# Patient Record
Sex: FEMALE | Race: WHITE | NOT HISPANIC OR LATINO | Employment: FULL TIME | URBAN - METROPOLITAN AREA
[De-identification: names, ages, dates, MRNs, and addresses within clinical notes are randomized per-mention and may not be internally consistent; named-entity substitution may affect disease eponyms.]

---

## 2018-04-07 ENCOUNTER — OFFICE VISIT (OUTPATIENT)
Dept: URGENT CARE | Facility: CLINIC | Age: 33
End: 2018-04-07
Payer: COMMERCIAL

## 2018-04-07 VITALS
TEMPERATURE: 97 F | SYSTOLIC BLOOD PRESSURE: 136 MMHG | OXYGEN SATURATION: 98 % | DIASTOLIC BLOOD PRESSURE: 92 MMHG | RESPIRATION RATE: 16 BRPM | HEIGHT: 63 IN | HEART RATE: 83 BPM | WEIGHT: 142 LBS | BODY MASS INDEX: 25.16 KG/M2

## 2018-04-07 DIAGNOSIS — H66.93 BILATERAL OTITIS MEDIA, UNSPECIFIED OTITIS MEDIA TYPE: ICD-10-CM

## 2018-04-07 DIAGNOSIS — J02.9 SORE THROAT: ICD-10-CM

## 2018-04-07 DIAGNOSIS — R52 BODY ACHES: Primary | ICD-10-CM

## 2018-04-07 DIAGNOSIS — J01.00 ACUTE NON-RECURRENT MAXILLARY SINUSITIS: ICD-10-CM

## 2018-04-07 LAB
CTP QC/QA: YES
CTP QC/QA: YES
FLUAV AG NPH QL: NEGATIVE
FLUBV AG NPH QL: NEGATIVE
S PYO RRNA THROAT QL PROBE: NEGATIVE

## 2018-04-07 PROCEDURE — 87804 INFLUENZA ASSAY W/OPTIC: CPT | Mod: QW,S$GLB,, | Performed by: NURSE PRACTITIONER

## 2018-04-07 PROCEDURE — 99212 OFFICE O/P EST SF 10 MIN: CPT | Mod: 25,S$GLB,, | Performed by: NURSE PRACTITIONER

## 2018-04-07 PROCEDURE — 87880 STREP A ASSAY W/OPTIC: CPT | Mod: QW,S$GLB,, | Performed by: NURSE PRACTITIONER

## 2018-04-07 PROCEDURE — 96372 THER/PROPH/DIAG INJ SC/IM: CPT | Mod: S$GLB,,, | Performed by: FAMILY MEDICINE

## 2018-04-07 RX ORDER — AMOXICILLIN AND CLAVULANATE POTASSIUM 875; 125 MG/1; MG/1
1 TABLET, FILM COATED ORAL 2 TIMES DAILY
Qty: 20 TABLET | Refills: 0 | Status: SHIPPED | OUTPATIENT
Start: 2018-04-07 | End: 2018-04-17

## 2018-04-07 RX ORDER — BETAMETHASONE SODIUM PHOSPHATE AND BETAMETHASONE ACETATE 3; 3 MG/ML; MG/ML
6 INJECTION, SUSPENSION INTRA-ARTICULAR; INTRALESIONAL; INTRAMUSCULAR; SOFT TISSUE
Status: COMPLETED | OUTPATIENT
Start: 2018-04-07 | End: 2018-04-07

## 2018-04-07 RX ADMIN — BETAMETHASONE SODIUM PHOSPHATE AND BETAMETHASONE ACETATE 6 MG: 3; 3 INJECTION, SUSPENSION INTRA-ARTICULAR; INTRALESIONAL; INTRAMUSCULAR; SOFT TISSUE at 03:04

## 2018-04-07 NOTE — PROGRESS NOTES
"Subjective:       Patient ID: Jessika Hassan is a 32 y.o. female.    Vitals:  height is 5' 3" (1.6 m) and weight is 64.4 kg (142 lb). Her temperature is 97.2 °F (36.2 °C). Her blood pressure is 136/92 (abnormal) and her pulse is 83. Her respiration is 16 and oxygen saturation is 98%.     Chief Complaint: Otalgia and Sore Throat    Pt in for sore throat, been having that for a few days. Then last night pt was having night sweats. Then this morning throat felt worse and has been having hot flashes and chest palpitations. Pt also having ear popping. Denies hx of strep. Been taking ibuprofen for symptoms. Having body aches. Popping in both ears, but mostly the right ear.       Otalgia    There is pain in both ears. The current episode started today. The problem has been rapidly worsening. The pain is at a severity of 8/10. Associated symptoms include headaches, hearing loss and a sore throat. Pertinent negatives include no abdominal pain or coughing.   Sore Throat    Associated symptoms include ear pain and headaches. Pertinent negatives include no abdominal pain, congestion, coughing, hoarse voice or shortness of breath.     Review of Systems   Constitution: Positive for chills, fever and malaise/fatigue.   HENT: Positive for ear pain, hearing loss and sore throat. Negative for congestion and hoarse voice.    Eyes: Negative for discharge and redness.   Cardiovascular: Negative for chest pain, dyspnea on exertion and leg swelling.   Respiratory: Negative for cough, shortness of breath, sputum production and wheezing.    Musculoskeletal: Positive for muscle cramps. Negative for myalgias.   Gastrointestinal: Negative for abdominal pain and nausea.   Neurological: Positive for dizziness, headaches and light-headedness.       Objective:      Physical Exam   Constitutional: She is oriented to person, place, and time. She appears well-developed and well-nourished. She is cooperative.  Non-toxic appearance. She does " not appear ill. No distress.   HENT:   Head: Normocephalic and atraumatic.   Right Ear: Hearing, external ear and ear canal normal. Tympanic membrane is retracted. A middle ear effusion is present.   Left Ear: Hearing, external ear and ear canal normal. Tympanic membrane is retracted. A middle ear effusion is present.   Nose: Mucosal edema and rhinorrhea present. No nasal deformity. No epistaxis. Right sinus exhibits maxillary sinus tenderness. Left sinus exhibits maxillary sinus tenderness. Left sinus exhibits no frontal sinus tenderness.   Mouth/Throat: Uvula is midline and mucous membranes are normal. No trismus in the jaw. Normal dentition. No uvula swelling. Posterior oropharyngeal erythema present.   Eyes: Conjunctivae and lids are normal. Right eye exhibits no discharge. Left eye exhibits no discharge. No scleral icterus.   Sclera clear bilat   Neck: Trachea normal, normal range of motion, full passive range of motion without pain and phonation normal. Neck supple.   Cardiovascular: Normal rate, regular rhythm, normal heart sounds, intact distal pulses and normal pulses.    Pulmonary/Chest: Effort normal and breath sounds normal. No respiratory distress.   Abdominal: Soft. Normal appearance and bowel sounds are normal. She exhibits no distension, no pulsatile midline mass and no mass. There is no tenderness.   Musculoskeletal: Normal range of motion. She exhibits no edema or deformity.   Lymphadenopathy:     She has cervical adenopathy.        Right cervical: Superficial cervical adenopathy present.        Left cervical: Superficial cervical adenopathy present.   Neurological: She is alert and oriented to person, place, and time. She exhibits normal muscle tone. Coordination normal.   Skin: Skin is warm, dry and intact. She is not diaphoretic. No pallor.   Psychiatric: She has a normal mood and affect. Her speech is normal and behavior is normal. Judgment and thought content normal. Cognition and memory are  normal.   Nursing note and vitals reviewed.      Assessment:       1. Body aches    2. Sore throat    3. Bilateral otitis media, unspecified otitis media type    4. Acute non-recurrent maxillary sinusitis        Plan:         Body aches  -     POCT Influenza A/B    Sore throat  -     POCT rapid strep A    Bilateral otitis media, unspecified otitis media type  -     amoxicillin-clavulanate 875-125mg (AUGMENTIN) 875-125 mg per tablet; Take 1 tablet by mouth 2 (two) times daily.  Dispense: 20 tablet; Refill: 0  -     fluticasone (VERAMYST) 27.5 mcg/actuation nasal spray; 2 sprays by Nasal route once daily.  Dispense: 1 g; Refill: 0    Acute non-recurrent maxillary sinusitis  -     betamethasone acetate-betamethasone sodium phosphate injection 6 mg; Inject 1 mL (6 mg total) into the muscle one time.  -     amoxicillin-clavulanate 875-125mg (AUGMENTIN) 875-125 mg per tablet; Take 1 tablet by mouth 2 (two) times daily.  Dispense: 20 tablet; Refill: 0  -     fluticasone (VERAMYST) 27.5 mcg/actuation nasal spray; 2 sprays by Nasal route once daily.  Dispense: 1 g; Refill: 0      Discussed the use of OTC allergy medications during his season until the weather decides to stay hot.     Katie pot may be beneficial with sterile or bottled water as directed.

## 2018-04-07 NOTE — LETTER
April 7, 2018      [facility] [address]  Phone: [phone]  Fax: [phone]       Patient: Jessika Hassan   YOB: 1985  Date of Visit: 04/07/2018    To Whom It May Concern:    Tana Hassan  was at [facility] on 04/07/2018. She may return to work/school on 4/8/2018  with no restrictions. If you have any questions or concerns, or if I can be of further assistance, please do not hesitate to contact me.    Sincerely,    Clara Fung, RT

## 2019-03-04 ENCOUNTER — OFFICE VISIT (OUTPATIENT)
Dept: URGENT CARE | Facility: CLINIC | Age: 34
End: 2019-03-04
Payer: COMMERCIAL

## 2019-03-04 VITALS
HEIGHT: 63 IN | HEART RATE: 83 BPM | DIASTOLIC BLOOD PRESSURE: 80 MMHG | RESPIRATION RATE: 18 BRPM | TEMPERATURE: 97 F | OXYGEN SATURATION: 99 % | BODY MASS INDEX: 23.92 KG/M2 | WEIGHT: 135 LBS | SYSTOLIC BLOOD PRESSURE: 113 MMHG

## 2019-03-04 DIAGNOSIS — F41.9 ANXIETY: Primary | ICD-10-CM

## 2019-03-04 DIAGNOSIS — Z91.89 HISTORY OF FAINTING SPELLS OF UNKNOWN CAUSE: ICD-10-CM

## 2019-03-04 DIAGNOSIS — Z87.898 HISTORY OF PALPITATIONS: ICD-10-CM

## 2019-03-04 LAB
B-HCG UR QL: NEGATIVE
BILIRUB UR QL STRIP: NEGATIVE
CTP QC/QA: YES
GLUCOSE SERPL-MCNC: 146 MG/DL (ref 70–110)
GLUCOSE UR QL STRIP: NEGATIVE
KETONES UR QL STRIP: POSITIVE
LEUKOCYTE ESTERASE UR QL STRIP: NEGATIVE
PH, POC UA: 6 (ref 5–8)
POC ANION GAP: 18 MMOL/L (ref 10–20)
POC BLOOD, URINE: NEGATIVE
POC BUN: 12 MMOL/L (ref 8–26)
POC CHLORIDE: 100 MMOL/L (ref 98–109)
POC CREATININE: 0.6 MG/DL (ref 0.6–1.3)
POC HEMATOCRIT: 41 %PCV (ref 37–47)
POC HEMOGLOBIN: 13.9 G/DL (ref 12.5–16)
POC ICA: 1.1 MMOL/L (ref 1.12–1.32)
POC NITRATES, URINE: NEGATIVE
POC POTASSIUM: 4.3 MMOL/L (ref 3.5–4.9)
POC SODIUM: 138 MMOL/L (ref 138–146)
POC TCO2: 25 MMOL/L (ref 24–29)
PROT UR QL STRIP: NEGATIVE
SP GR UR STRIP: 1 (ref 1–1.03)
UROBILINOGEN UR STRIP-ACNC: NORMAL (ref 0.1–1.1)

## 2019-03-04 PROCEDURE — 81025 URINE PREGNANCY TEST: CPT | Mod: S$GLB,,, | Performed by: NURSE PRACTITIONER

## 2019-03-04 PROCEDURE — 80047 BASIC METABLC PNL IONIZED CA: CPT | Mod: QW,S$GLB,, | Performed by: NURSE PRACTITIONER

## 2019-03-04 PROCEDURE — 81025 POCT URINE PREGNANCY: ICD-10-PCS | Mod: S$GLB,,, | Performed by: NURSE PRACTITIONER

## 2019-03-04 PROCEDURE — 93000 ELECTROCARDIOGRAM COMPLETE: CPT | Mod: S$GLB,,, | Performed by: INTERNAL MEDICINE

## 2019-03-04 PROCEDURE — 3008F BODY MASS INDEX DOCD: CPT | Mod: CPTII,S$GLB,, | Performed by: NURSE PRACTITIONER

## 2019-03-04 PROCEDURE — 3008F PR BODY MASS INDEX (BMI) DOCUMENTED: ICD-10-PCS | Mod: CPTII,S$GLB,, | Performed by: NURSE PRACTITIONER

## 2019-03-04 PROCEDURE — 81003 POCT URINALYSIS, DIPSTICK, AUTOMATED, W/O SCOPE: ICD-10-PCS | Mod: QW,S$GLB,, | Performed by: NURSE PRACTITIONER

## 2019-03-04 PROCEDURE — 99214 OFFICE O/P EST MOD 30 MIN: CPT | Mod: 25,S$GLB,, | Performed by: NURSE PRACTITIONER

## 2019-03-04 PROCEDURE — 99214 PR OFFICE/OUTPT VISIT, EST, LEVL IV, 30-39 MIN: ICD-10-PCS | Mod: 25,S$GLB,, | Performed by: NURSE PRACTITIONER

## 2019-03-04 PROCEDURE — 80047 POCT CHEMISTRY PANEL: ICD-10-PCS | Mod: QW,S$GLB,, | Performed by: NURSE PRACTITIONER

## 2019-03-04 PROCEDURE — 93000 EKG 12-LEAD: ICD-10-PCS | Mod: S$GLB,,, | Performed by: INTERNAL MEDICINE

## 2019-03-04 PROCEDURE — 81003 URINALYSIS AUTO W/O SCOPE: CPT | Mod: QW,S$GLB,, | Performed by: NURSE PRACTITIONER

## 2019-03-04 NOTE — PROGRESS NOTES
"Subjective:       Patient ID: Jessika Hassan is a 33 y.o. female.    Vitals:  height is 5' 3" (1.6 m) and weight is 61.2 kg (135 lb). Her oral temperature is 96.7 °F (35.9 °C). Her blood pressure is 113/80 and her pulse is 83. Her respiration is 18 and oxygen saturation is 99%.     Chief Complaint: Palpitations    Patient presents palpitations & lightheadedness that started 1-2 hours ago.  She states that she got waves of lightheadedness.  She has a history of anxiety, but does not take any prescribed medications for it.       Palpitations    This is a new problem. The current episode started today. The problem occurs intermittently. The problem has been gradually worsening. The symptoms are aggravated by stress. Associated symptoms include anxiety, dizziness, an irregular heartbeat and nausea. Pertinent negatives include no chest fullness, chest pain, coughing, diaphoresis, fever, malaise/fatigue, near-syncope, numbness, shortness of breath, syncope, vomiting or weakness. She has tried nothing for the symptoms. Risk factors include stress. Her past medical history is significant for anxiety. There is no history of anemia, drug use, heart disease, hyperthyroidism or a valve disorder.       Constitution: Negative for chills, sweating and fever.   HENT: Negative for tinnitus, facial swelling, congestion and sinus pain.    Neck: Negative for neck pain and neck stiffness.   Cardiovascular: Positive for palpitations. Negative for chest pain and passing out.   Eyes: Negative for eye pain, photophobia, vision loss, double vision and blurred vision.   Respiratory: Negative for cough and shortness of breath.    Gastrointestinal: Positive for nausea. Negative for vomiting.   Genitourinary: Negative for missed menses.   Musculoskeletal: Negative for trauma and muscle ache.   Skin: Negative for rash, wound and lesion.   Neurological: Positive for dizziness, light-headedness and headaches. Negative for history of " vertigo, facial drooping, speech difficulty, coordination disturbances, loss of balance, history of migraines, disorientation, loss of consciousness and numbness.        Patient reports history of dizziness and feeling like she was going to pass out; however denies present dizziness, disorientation, headache, nausea, memory loss, slurred speech or visual disturbance.     Psychiatric/Behavioral: Positive for nervous/anxious. Negative for disorientation, confusion, sleep disturbance and depression. The patient is nervous/anxious.        Objective:      Physical Exam   Constitutional: She is oriented to person, place, and time. Vital signs are normal. She appears well-developed and well-nourished. She is cooperative.  Non-toxic appearance. She does not have a sickly appearance. She does not appear ill. She appears distressed.   HENT:   Head: Normocephalic and atraumatic.   Right Ear: Hearing, tympanic membrane, external ear and ear canal normal.   Left Ear: Hearing, tympanic membrane, external ear and ear canal normal.   Nose: Nose normal. No mucosal edema, rhinorrhea or nasal deformity. No epistaxis. Right sinus exhibits no maxillary sinus tenderness and no frontal sinus tenderness. Left sinus exhibits no maxillary sinus tenderness and no frontal sinus tenderness.   Mouth/Throat: Uvula is midline, oropharynx is clear and moist and mucous membranes are normal. No trismus in the jaw. Normal dentition. No uvula swelling. No posterior oropharyngeal erythema.   Eyes: Conjunctivae, EOM and lids are normal. Pupils are equal, round, and reactive to light. Right eye exhibits no discharge. Left eye exhibits no discharge. No scleral icterus.   Sclera clear bilat   Neck: Trachea normal, normal range of motion, full passive range of motion without pain and phonation normal. Neck supple.   Cardiovascular: Normal rate, regular rhythm, S1 normal, S2 normal, normal heart sounds, intact distal pulses and normal pulses.    Pulmonary/Chest: Effort normal and breath sounds normal. No respiratory distress. She has no decreased breath sounds. She has no wheezes. She has no rhonchi. She has no rales.   Abdominal: Soft. Normal appearance and bowel sounds are normal. She exhibits no distension, no pulsatile midline mass and no mass. There is no tenderness.   Musculoskeletal: Normal range of motion. She exhibits no edema or deformity.   Neurological: She is alert and oriented to person, place, and time. She has normal strength. She is not disoriented. No cranial nerve deficit or sensory deficit. She exhibits normal muscle tone. She displays a negative Romberg sign. Coordination normal.   Skin: Skin is warm, dry and intact. No rash noted. She is not diaphoretic. No pallor.   Psychiatric: Her speech is normal and behavior is normal. Judgment and thought content normal. Her mood appears anxious. Cognition and memory are normal.   Nursing note and vitals reviewed.      The EKG shows a normal, regular Sinus Rhythm, at a rate of 94, there are not ST Changes. There is not a previous EKG for comparison. This EKG was interpreted by me (and discussed with Dr. Huizar).    Assessment:       1. Anxiety    2. History of palpitations    3. History of fainting spells of unknown cause        Plan:         Anxiety  -     Ambulatory referral to Internal Medicine  -     Cancel: Refer to Emergency Dept.  -     POCT Chemistry Panel    History of palpitations  -     IN OFFICE EKG 12-LEAD (to [place])  -     Ambulatory referral to Internal Medicine  -     Cancel: Refer to Emergency Dept.  -     POCT Chemistry Panel  -     POCT Urinalysis, Dipstick, Automated, W/O Scope  -     POCT urine pregnancy    History of fainting spells of unknown cause    -  Patient advised to go to the ER for any worsening of symptoms or return of symptoms. Patient verbalizes understanding and agrees with plan of care.    Consulted case with Dr. Huizar who agreed with treatment of care.    Patient Instructions       General Referral to [facility]  You were referred to [facility] to Establish Care.  Please call [phone]  to schedule your appointment.    Please return here or go to the Emergency Department for any concerns or worsening of condition.  Please follow up with your primary care doctor or specialist in the next 48-72hrs as needed.    If you  smoke, please stop smoking.    General Discharge Instructions   If you were prescribed a narcotic or controlled medication, do not drive or operate heavy equipment or machinery while taking these medications.  If you were prescribed antibiotics, please take them to completion.  You must understand that you've received an Urgent Care treatment only and that you may be released before all your medical problems are known or treated. You, the patient, will arrange for follow up care as instructed.  Follow up with your PCP or specialty clinic as directed in the next 1-2 days if not improved or as needed.  You can call [phone] to schedule an appointment with the appropriate provider.  If your condition worsens we recommend that you receive another evaluation at the emergency room immediately or contact your primary medical clinics after hours call service to discuss your concerns.  Please go to the Emergency Department for any concerns or worsening of condition.      Your Bodys Response to Anxiety    Normal anxiety is part of the bodys natural defense system. It's an alert to a threat that is unknown, vague, or comes from your own internal fears. While youre in this state, your feelings can range from a vague sense of worry to physical sensations such as a pounding heartbeat. These feelings make you want to react to the threat. An anxiety response is normal in many situations. But when you have an anxiety disorder, the same response can occur at the wrong times.  Anxiety can be helpful  Normal anxiety is a signal from  "your brain that warns you of a threat and is a normal response to help you prevent something or decrease the bad effects of something you can't control. For example, anxiety is a normal response to situations that might damage your body, separate you from a loved one, or lose your job. The symptoms of anxiety can be physical and mental.  How does it feel?  At certain times, people with anxiety may have:  · Dizziness  · Muscle tension or pain  · Restlessness  · Sleeplessness  · Trouble concentrating  · Racing heartbeat  · Fast breathing  · Shaking or trembling  · Stomachache  · Diarrhea  · Loss of energy  · Sweating  · Cold, clammy hands  · Chest pain  · Dry mouth  Anxiety can also be a problem  Anxiety can become a problem when it is hard to control, occurs for months, and interferes with important parts of your life. With an anxiety disorder, your body has the response described above, but in inappropriate ways. The response a person has depends on the anxiety disorder he or she has. With some disorders, the anxiety is way out of proportion to the threat that triggers it. With others, anxiety may occur even when there isnt a clear threat or trigger.  Who does it affect?  Some people are more prone to persistent anxiety than others. It tends to run in families, and it affects more younger people than older people, and more women than men. But no age, race, or gender is immune to anxiety problems.  Anxiety can be treated  The good news is that the anxiety thats disrupting your life can be treated. Check with your healthcare provider and rule out any physical problems that may be causing the anxiety symptoms. If an anxiety disorder is diagnosed seek mental healthcare. This is an illness and it can respond to treatment. Most types of anxiety disorders will respond to "talk therapy" and medicines. Working with your doctor or other healthcare provider, you can develop skills to help you cope with anxiety. You can also " gain the perspective you need to overcome your fears. Note: Good sources of support or guidance can be found at your local hospital, mental health clinic, or an employee assistance program.  How to cope with anxiety  If anxiety is wearing you down, here are some things you can do to cope:  · Keep in mind that you cant control everything about a situation. Change what you can and let the rest take its course.  · Exercise--its a great way to relieve tension and help your body feel relaxed.  · Avoid caffeine and nicotine, which can make anxiety symptoms worse.  · Fight the temptation to turn to alcohol or unprescribed drugs for relief. They only make things worse in the long run.  · Educate yourself about anxiety disorders. Keep track of helpful online resources and books you can use during stressful periods.  · Try stress management techniques such as meditation.  · Consider online or in-person support groups.   Date Last Reviewed: 1/1/2017  © 3333-3742 [employer]. [address] [place] [address], [place] [address]. All rights reserved. This information is not intended as a substitute for professional medical care. Always follow your healthcare professional's instructions.

## 2019-03-04 NOTE — PATIENT INSTRUCTIONS
General Referral to [facility]  You were referred to [facility] to Establish Care.  Please call [phone]  to schedule your appointment.    Please return here or go to the Emergency Department for any concerns or worsening of condition.  Please follow up with your primary care doctor or specialist in the next 48-72hrs as needed.    If you  smoke, please stop smoking.    General Discharge Instructions   If you were prescribed a narcotic or controlled medication, do not drive or operate heavy equipment or machinery while taking these medications.  If you were prescribed antibiotics, please take them to completion.  You must understand that you've received an Urgent Care treatment only and that you may be released before all your medical problems are known or treated. You, the patient, will arrange for follow up care as instructed.  Follow up with your PCP or specialty clinic as directed in the next 1-2 days if not improved or as needed.  You can call [phone] to schedule an appointment with the appropriate provider.  If your condition worsens we recommend that you receive another evaluation at the emergency room immediately or contact your primary medical clinics after hours call service to discuss your concerns.  Please go to the Emergency Department for any concerns or worsening of condition.      Your Bodys Response to Anxiety    Normal anxiety is part of the bodys natural defense system. It's an alert to a threat that is unknown, vague, or comes from your own internal fears. While youre in this state, your feelings can range from a vague sense of worry to physical sensations such as a pounding heartbeat. These feelings make you want to react to the threat. An anxiety response is normal in many situations. But when you have an anxiety disorder, the same response can occur at the wrong times.  Anxiety can be helpful  Normal anxiety is a signal from your brain that warns you of a  "threat and is a normal response to help you prevent something or decrease the bad effects of something you can't control. For example, anxiety is a normal response to situations that might damage your body, separate you from a loved one, or lose your job. The symptoms of anxiety can be physical and mental.  How does it feel?  At certain times, people with anxiety may have:  · Dizziness  · Muscle tension or pain  · Restlessness  · Sleeplessness  · Trouble concentrating  · Racing heartbeat  · Fast breathing  · Shaking or trembling  · Stomachache  · Diarrhea  · Loss of energy  · Sweating  · Cold, clammy hands  · Chest pain  · Dry mouth  Anxiety can also be a problem  Anxiety can become a problem when it is hard to control, occurs for months, and interferes with important parts of your life. With an anxiety disorder, your body has the response described above, but in inappropriate ways. The response a person has depends on the anxiety disorder he or she has. With some disorders, the anxiety is way out of proportion to the threat that triggers it. With others, anxiety may occur even when there isnt a clear threat or trigger.  Who does it affect?  Some people are more prone to persistent anxiety than others. It tends to run in families, and it affects more younger people than older people, and more women than men. But no age, race, or gender is immune to anxiety problems.  Anxiety can be treated  The good news is that the anxiety thats disrupting your life can be treated. Check with your healthcare provider and rule out any physical problems that may be causing the anxiety symptoms. If an anxiety disorder is diagnosed seek mental healthcare. This is an illness and it can respond to treatment. Most types of anxiety disorders will respond to "talk therapy" and medicines. Working with your doctor or other healthcare provider, you can develop skills to help you cope with anxiety. You can also gain the perspective you need " to overcome your fears. Note: Good sources of support or guidance can be found at your local hospital, mental health clinic, or an employee assistance program.  How to cope with anxiety  If anxiety is wearing you down, here are some things you can do to cope:  · Keep in mind that you cant control everything about a situation. Change what you can and let the rest take its course.  · Exercise--its a great way to relieve tension and help your body feel relaxed.  · Avoid caffeine and nicotine, which can make anxiety symptoms worse.  · Fight the temptation to turn to alcohol or unprescribed drugs for relief. They only make things worse in the long run.  · Educate yourself about anxiety disorders. Keep track of helpful online resources and books you can use during stressful periods.  · Try stress management techniques such as meditation.  · Consider online or in-person support groups.   Date Last Reviewed: 1/1/2017  © 0917-3906 [employer]. [address] [place] [address], [place] [address]. All rights reserved. This information is not intended as a substitute for professional medical care. Always follow your healthcare professional's instructions.

## 2019-03-07 ENCOUNTER — TELEPHONE (OUTPATIENT)
Dept: URGENT CARE | Facility: CLINIC | Age: 34
End: 2019-03-07

## 2019-03-11 NOTE — PROGRESS NOTES
Normal sinus rhythm  Normal ECG  When compared with ECG of 04-MAR-2019 13:13,  No significant change was found  Confirmed by Sheldon Eckert MD ([id number]) on 3/11/2019 9:11:01 AM

## 2019-04-17 ENCOUNTER — OFFICE VISIT (OUTPATIENT)
Dept: URGENT CARE | Facility: CLINIC | Age: 34
End: 2019-04-17
Payer: COMMERCIAL

## 2019-04-17 VITALS
TEMPERATURE: 99 F | BODY MASS INDEX: 23.92 KG/M2 | HEART RATE: 91 BPM | SYSTOLIC BLOOD PRESSURE: 121 MMHG | OXYGEN SATURATION: 99 % | WEIGHT: 135 LBS | DIASTOLIC BLOOD PRESSURE: 79 MMHG | HEIGHT: 63 IN | RESPIRATION RATE: 16 BRPM

## 2019-04-17 DIAGNOSIS — T78.40XA ALLERGIC REACTION, INITIAL ENCOUNTER: Primary | ICD-10-CM

## 2019-04-17 PROCEDURE — 99214 OFFICE O/P EST MOD 30 MIN: CPT | Mod: S$GLB,,, | Performed by: NURSE PRACTITIONER

## 2019-04-17 PROCEDURE — 3008F BODY MASS INDEX DOCD: CPT | Mod: CPTII,S$GLB,, | Performed by: NURSE PRACTITIONER

## 2019-04-17 PROCEDURE — 99214 PR OFFICE/OUTPT VISIT, EST, LEVL IV, 30-39 MIN: ICD-10-PCS | Mod: S$GLB,,, | Performed by: NURSE PRACTITIONER

## 2019-04-17 PROCEDURE — 3008F PR BODY MASS INDEX (BMI) DOCUMENTED: ICD-10-PCS | Mod: CPTII,S$GLB,, | Performed by: NURSE PRACTITIONER

## 2019-04-17 RX ORDER — PREDNISONE 20 MG/1
40 TABLET ORAL DAILY
Qty: 6 TABLET | Refills: 0 | Status: SHIPPED | OUTPATIENT
Start: 2019-04-17 | End: 2019-04-20

## 2019-04-17 NOTE — PATIENT INSTRUCTIONS
General Allergic Reactions  An allergic reaction is a set of symptoms caused by an allergen. An allergen is something that causes a persons immune system to react. When a person comes in contact with an allergen, it causes the body to release chemicals. These include the chemical histamine. Histamine causes swelling and itching. It may affect the entire body. This is called a general allergic reaction. Often symptoms affect only 1 part of the body. This is called a local allergic reaction.  You are having an allergic reaction. Almost anything can cause one. Different people are allergic to different things. It is usually something that you ate or swallowed, came into contact with by getting or putting it on your skin or clothes, or something you breathed in the air. This can be very annoying and sometimes scary.  Most of us think of allergic reactions when we have a rash or itchy skin. Symptoms can include:  · Itching of the eyes, nose, and roof of the mouth  · Runny or stuffy nose  · Watery eyes   · Sneezing or coughing   · A blocked feeling in the ear  · Red, itchy rash called hives  · Red and purple spots  · Rash, redness, welts, blisters  · Itching, burning, stinging, pain  · Dry, flaky, cracking, scaly skin  Severe symptoms include:  · Swelling of the face, lips, or other parts of the body  · Hoarse voice  · Trouble swallowing, feeling like your throat is closing  · Trouble breathing, wheezing  · Nausea, vomiting, diarrhea, stomach cramps  · Feeling faint or lightheaded, rapid heart rate  Sometimes the cause may be obvious. But there are so many things that can cause a reaction that you may not be able to figure out. The most important things to help find your allergen are:  · Remembering when it started  · What you were doing at the time or just before that  · Any activities you were involved in  · Any new products or contacts  Below are some common causes. But remember that almost anything can cause a  reaction. You may not even be aware that you came into contact with one of these things:  · Dust, mold, pollen  · Plants (common ones are poison ivy and poison oak, but there are many others)   · Animals  · Foods such as shrimp, shellfish, peanuts, milk products, gluten, and eggs. Also food colorings, flavorings, and additives.  · Insect bites or stings such as bees, mosquitos, fleas, ticks  · Medicines such as penicillin, sulfa medicines, amoxicillin, aspirin, and ibuprofen. But any medicine can cause a reaction.  · Jewelry such as nickel or gold. This can be new, or something youve worn for a while, including zippers and buttons.  · Latex such as in gloves, clothes, toys, balloons, or some tapes. Some people allergic to latex may also have problems with foods like bananas, avocados, kiwi, papaya, or chestnuts.  · Lotions, perfumes, cosmetics, soaps, shampoos, skincare products, nail products  · Chemicals or dyes in clothing, linen, , hair dyes, soaps, iodine  Many viruses and common colds can cause a rash that is not an allergic reaction. Sometimes it is hard to tell the difference between allergies, sensitivity, or an intolerance to something. This is especially true with food. Many things can cause diarrhea, vomiting, stomach cramps, and skin irritation.  Home care    The goal of treatment is to help relieve the symptoms and get you feeling better. The rash will usually fade over several days. But it can sometimes last a couple of weeks. Over the next couple of days, there may be times when it is gets a little worse, and then better again. Here are some things to do:  · If you know what you are allergic to, stay away from it. Future reactions could be worse than this one.  · Avoid tight clothing and anything that heats up your skin (hot showers or baths, direct sunlight). Heat will make itching worse.  · An ice pack will relieve local areas of intense itching and redness. To make an ice pack, put ice  cubes in a plastic bag that seals at the top. Wrap it in a thin, clean towel. Dont put the ice directly on the skin because it can damage the skin.  · Oral diphenhydramine is an over-the-counter antihistamine sold at pharmacy and grocery stores. Unless a prescription antihistamine was given, diphenhydramine may be used to reduce itching if large areas of the skin are involved. It may make you sleepy. So be careful using it in the daytime or when going to school, working, or driving. Note: Dont use diphenhydramine if you have glaucoma or if you are a man with trouble urinating due to an enlarged prostate. There are other antihistamines that wont make you so sleepy. These are good choices for daytime use. Ask your pharmacist for suggestions.  · Dont use diphenhydramine cream on your skin. It can cause a further reaction in some people.  · To help prevent an infection, don't scratch the affected area. Scratching may worsen the reaction and damage your skin. It can also lead to an infection. Always check the affected for signs of an infection.  · Call your healthcare provider and ask what you can use to help decrease the itching.  · To decrease allergic reactions, try the following:    · Use heat-steam to clean your home  · Use high-efficiency particulate (HEPA) vacuums and filters  · Stay away from food and pet triggers  · Kill any cockroaches  · Clean your house often  Follow-up care  Follow up with your healthcare provider, or as advised. If you had a severe reaction today, or if you have had several mild to medium allergic reactions in the past, ask your provider about allergy testing. This can help you find out what you are allergic to. If your reaction included dizziness, fainting, or trouble breathing or swallowing, ask your provider about carrying auto-injectable epinephrine.  Call 911  Call 911 if any of these occur:  · Trouble breathing or swallowing, wheezing  · Cool, moist, pale skin  · Shortness of  breath  · Hoarse voice or trouble speaking  · Confused   · Very drowsy or trouble awakening  · Fainting or loss of consciousness  · Rapid heart rate  · Feeling of dizziness or weakness or a sudden drop in blood pressure  · Feeling of doom  · Feeling lightheaded  · Severe nausea or vomiting, or diarrhea  · Seizure  · Swelling in the face, eyelids, lips, mouth, throat or tongue  · Drooling  When to seek medical advice  Call your healthcare provider right away if any of these occur:  · Spreading areas of itching, redness or swelling  · Nausea or stomach cramps or abdominal pain  · Continuing or recurring symptoms  · Spreading areas of redness, swelling, or itching  · Signs of infection at the affected site:  ¨ Spreading redness  ¨ Increased pain or swelling  ¨ Fluid or colored drainage from the site  ¨ Fever of 100.4°F (38°C) or above lasting for 24 to 48 hours, or as directed by your provider  Date Last Reviewed: 3/1/2017  © 5041-3047 [employer] [address] [place] [address], [place] [address]. All rights reserved. This information is not intended as a substitute for professional medical care. Always follow your healthcare professional's instructions.

## 2019-04-17 NOTE — PROGRESS NOTES
"Subjective:       Patient ID: Jessika Hassan is a 33 y.o. female.    Vitals:  height is 5' 3" (1.6 m) and weight is 61.2 kg (135 lb). Her temperature is 99.3 °F (37.4 °C). Her blood pressure is 121/79 and her pulse is 91. Her respiration is 16 and oxygen saturation is 99%.     Chief Complaint: Allergic Reaction    Pt stated around 2:45pm she ate a seafood gumbo, fried alligator, and shrimp devil egg. Pt stated with in minutes after eating she started having runny nose, cough, itching in throat, and itching on arms, chest, and neck. Pt took a zyrtec and that helped with cough and runny nose. Pt stated she never had a allergic reaction before. Unsure of the cause.     Patient states her lower lip felt "tingly", her upper chest was red with small bumps, then her upper arms, her face felt flushed, etc. Immediately after having eaten several food items while at work. Taken Zyrtec and improving.  No Hx of similar reaction and no known food allergies.  Able to breathe without wheezing or stridor.    Allergic Reaction   This is a new problem. The current episode started today. The problem has been gradually improving since onset. It is unknown what she was exposed to. Associated symptoms include coughing, eye itching, eye watering and itching. Pertinent negatives include no abdominal pain, chest pain, chest pressure, diarrhea, difficulty breathing, drooling, eye redness, globus sensation, hyperventilation, rash, skin blistering, stridor, trouble swallowing, vomiting or wheezing. There is no history of asthma, atopic dermatitis, food allergies, medication allergies or seasonal allergies.       Constitution: Negative for chills, sweating, fatigue and fever.   HENT: Positive for postnasal drip. Negative for ear pain, drooling, congestion, sinus pain, sinus pressure, sore throat, trouble swallowing and voice change.    Neck: Negative for painful lymph nodes.   Cardiovascular: Negative for chest pain.   Eyes: Positive " for eye itching. Negative for eye redness.   Respiratory: Positive for cough. Negative for chest tightness, sputum production, bloody sputum, COPD, shortness of breath, stridor, wheezing and asthma.    Gastrointestinal: Negative for abdominal pain, nausea, vomiting and diarrhea.   Musculoskeletal: Negative for muscle ache.   Skin: Negative for rash.   Allergic/Immunologic: Positive for itching. Negative for seasonal allergies, food allergies and asthma.   Hematologic/Lymphatic: Negative for swollen lymph nodes.       Objective:      Physical Exam   Constitutional: She is oriented to person, place, and time. She appears well-developed and well-nourished. She is cooperative.  Non-toxic appearance. She does not appear ill. No distress.   HENT:   Head: Normocephalic and atraumatic.   Right Ear: Hearing, tympanic membrane, external ear and ear canal normal.   Left Ear: Hearing, tympanic membrane, external ear and ear canal normal.   Nose: Rhinorrhea present. No mucosal edema or nasal deformity. No epistaxis. Right sinus exhibits no maxillary sinus tenderness and no frontal sinus tenderness. Left sinus exhibits no maxillary sinus tenderness and no frontal sinus tenderness.   Mouth/Throat: Uvula is midline and mucous membranes are normal. No trismus in the jaw. Normal dentition. No uvula swelling. Posterior oropharyngeal erythema present. No posterior oropharyngeal edema.   Eyes: Conjunctivae and lids are normal. No scleral icterus.   Sclera clear bilat   Neck: Trachea normal, full passive range of motion without pain and phonation normal. Neck supple.   Cardiovascular: Normal rate, regular rhythm, normal heart sounds, intact distal pulses and normal pulses.   Pulmonary/Chest: Effort normal and breath sounds normal. No stridor. No respiratory distress. She has no decreased breath sounds. She has no wheezes.   Abdominal: Soft. Normal appearance and bowel sounds are normal. She exhibits no distension. There is no tenderness.    Musculoskeletal: Normal range of motion. She exhibits no edema or deformity.   Neurological: She is alert and oriented to person, place, and time. She exhibits normal muscle tone. Coordination normal.   Skin: Skin is warm, dry and intact. She is not diaphoretic. No pallor.   Psychiatric: She has a normal mood and affect. Her speech is normal and behavior is normal. Judgment and thought content normal. Cognition and memory are normal.   Nursing note and vitals reviewed.      Assessment:       1. Allergic reaction, initial encounter        Plan:       Continue taking Zyrtec daily.    Allergic reaction, initial encounter  -     predniSONE (DELTASONE) 20 MG tablet; Take 2 tablets (40 mg total) by mouth once daily. for 3 days  Dispense: 6 tablet; Refill: 0          Patient Instructions     General Allergic Reactions  An allergic reaction is a set of symptoms caused by an allergen. An allergen is something that causes a persons immune system to react. When a person comes in contact with an allergen, it causes the body to release chemicals. These include the chemical histamine. Histamine causes swelling and itching. It may affect the entire body. This is called a general allergic reaction. Often symptoms affect only 1 part of the body. This is called a local allergic reaction.  You are having an allergic reaction. Almost anything can cause one. Different people are allergic to different things. It is usually something that you ate or swallowed, came into contact with by getting or putting it on your skin or clothes, or something you breathed in the air. This can be very annoying and sometimes scary.  Most of us think of allergic reactions when we have a rash or itchy skin. Symptoms can include:  · Itching of the eyes, nose, and roof of the mouth  · Runny or stuffy nose  · Watery eyes   · Sneezing or coughing   · A blocked feeling in the ear  · Red, itchy rash called hives  · Red and purple spots  · Rash, redness, welts,  blisters  · Itching, burning, stinging, pain  · Dry, flaky, cracking, scaly skin  Severe symptoms include:  · Swelling of the face, lips, or other parts of the body  · Hoarse voice  · Trouble swallowing, feeling like your throat is closing  · Trouble breathing, wheezing  · Nausea, vomiting, diarrhea, stomach cramps  · Feeling faint or lightheaded, rapid heart rate  Sometimes the cause may be obvious. But there are so many things that can cause a reaction that you may not be able to figure out. The most important things to help find your allergen are:  · Remembering when it started  · What you were doing at the time or just before that  · Any activities you were involved in  · Any new products or contacts  Below are some common causes. But remember that almost anything can cause a reaction. You may not even be aware that you came into contact with one of these things:  · Dust, mold, pollen  · Plants (common ones are poison ivy and poison oak, but there are many others)   · Animals  · Foods such as shrimp, shellfish, peanuts, milk products, gluten, and eggs. Also food colorings, flavorings, and additives.  · Insect bites or stings such as bees, mosquitos, fleas, ticks  · Medicines such as penicillin, sulfa medicines, amoxicillin, aspirin, and ibuprofen. But any medicine can cause a reaction.  · Jewelry such as nickel or gold. This can be new, or something youve worn for a while, including zippers and buttons.  · Latex such as in gloves, clothes, toys, balloons, or some tapes. Some people allergic to latex may also have problems with foods like bananas, avocados, kiwi, papaya, or chestnuts.  · Lotions, perfumes, cosmetics, soaps, shampoos, skincare products, nail products  · Chemicals or dyes in clothing, linen, , hair dyes, soaps, iodine  Many viruses and common colds can cause a rash that is not an allergic reaction. Sometimes it is hard to tell the difference between allergies, sensitivity, or an  intolerance to something. This is especially true with food. Many things can cause diarrhea, vomiting, stomach cramps, and skin irritation.  Home care    The goal of treatment is to help relieve the symptoms and get you feeling better. The rash will usually fade over several days. But it can sometimes last a couple of weeks. Over the next couple of days, there may be times when it is gets a little worse, and then better again. Here are some things to do:  · If you know what you are allergic to, stay away from it. Future reactions could be worse than this one.  · Avoid tight clothing and anything that heats up your skin (hot showers or baths, direct sunlight). Heat will make itching worse.  · An ice pack will relieve local areas of intense itching and redness. To make an ice pack, put ice cubes in a plastic bag that seals at the top. Wrap it in a thin, clean towel. Dont put the ice directly on the skin because it can damage the skin.  · Oral diphenhydramine is an over-the-counter antihistamine sold at pharmacy and grocery stores. Unless a prescription antihistamine was given, diphenhydramine may be used to reduce itching if large areas of the skin are involved. It may make you sleepy. So be careful using it in the daytime or when going to school, working, or driving. Note: Dont use diphenhydramine if you have glaucoma or if you are a man with trouble urinating due to an enlarged prostate. There are other antihistamines that wont make you so sleepy. These are good choices for daytime use. Ask your pharmacist for suggestions.  · Dont use diphenhydramine cream on your skin. It can cause a further reaction in some people.  · To help prevent an infection, don't scratch the affected area. Scratching may worsen the reaction and damage your skin. It can also lead to an infection. Always check the affected for signs of an infection.  · Call your healthcare provider and ask what you can use to help decrease the  itching.  · To decrease allergic reactions, try the following:    · Use heat-steam to clean your home  · Use high-efficiency particulate (HEPA) vacuums and filters  · Stay away from food and pet triggers  · Kill any cockroaches  · Clean your house often  Follow-up care  Follow up with your healthcare provider, or as advised. If you had a severe reaction today, or if you have had several mild to medium allergic reactions in the past, ask your provider about allergy testing. This can help you find out what you are allergic to. If your reaction included dizziness, fainting, or trouble breathing or swallowing, ask your provider about carrying auto-injectable epinephrine.  Call 911  Call 911 if any of these occur:  · Trouble breathing or swallowing, wheezing  · Cool, moist, pale skin  · Shortness of breath  · Hoarse voice or trouble speaking  · Confused   · Very drowsy or trouble awakening  · Fainting or loss of consciousness  · Rapid heart rate  · Feeling of dizziness or weakness or a sudden drop in blood pressure  · Feeling of doom  · Feeling lightheaded  · Severe nausea or vomiting, or diarrhea  · Seizure  · Swelling in the face, eyelids, lips, mouth, throat or tongue  · Drooling  When to seek medical advice  Call your healthcare provider right away if any of these occur:  · Spreading areas of itching, redness or swelling  · Nausea or stomach cramps or abdominal pain  · Continuing or recurring symptoms  · Spreading areas of redness, swelling, or itching  · Signs of infection at the affected site:  ¨ Spreading redness  ¨ Increased pain or swelling  ¨ Fluid or colored drainage from the site  ¨ Fever of 100.4°F (38°C) or above lasting for 24 to 48 hours, or as directed by your provider  Date Last Reviewed: 3/1/2017  © 6315-5422 [employer]. [address] [place] [address], [place] [address]. All rights reserved. This information is not intended as a substitute for professional medical care. Always follow your  healthcare professional's instructions.

## 2019-04-20 ENCOUNTER — TELEPHONE (OUTPATIENT)
Dept: URGENT CARE | Facility: CLINIC | Age: 34
End: 2019-04-20

## 2023-09-20 ENCOUNTER — CLINICAL SUPPORT (OUTPATIENT)
Dept: OBSTETRICS AND GYNECOLOGY | Facility: CLINIC | Age: 38
End: 2023-09-20
Payer: COMMERCIAL

## 2023-09-20 DIAGNOSIS — N91.2 AMENORRHEA: Primary | ICD-10-CM

## 2023-09-22 ENCOUNTER — OFFICE VISIT (OUTPATIENT)
Dept: OBSTETRICS AND GYNECOLOGY | Facility: CLINIC | Age: 38
End: 2023-09-22
Payer: COMMERCIAL

## 2023-09-22 ENCOUNTER — PATIENT MESSAGE (OUTPATIENT)
Dept: OBSTETRICS AND GYNECOLOGY | Facility: CLINIC | Age: 38
End: 2023-09-22

## 2023-09-22 ENCOUNTER — HOSPITAL ENCOUNTER (OUTPATIENT)
Dept: PERINATAL CARE | Facility: OTHER | Age: 38
Discharge: HOME | End: 2023-09-22
Attending: STUDENT IN AN ORGANIZED HEALTH CARE EDUCATION/TRAINING PROGRAM
Payer: COMMERCIAL

## 2023-09-22 VITALS
WEIGHT: 152.75 LBS | BODY MASS INDEX: 27.07 KG/M2 | SYSTOLIC BLOOD PRESSURE: 122 MMHG | HEIGHT: 63 IN | DIASTOLIC BLOOD PRESSURE: 84 MMHG

## 2023-09-22 DIAGNOSIS — N91.2 AMENORRHEA: ICD-10-CM

## 2023-09-22 DIAGNOSIS — Z3A.01 5 WEEKS GESTATION OF PREGNANCY: ICD-10-CM

## 2023-09-22 DIAGNOSIS — Z12.4 PAP SMEAR FOR CERVICAL CANCER SCREENING: ICD-10-CM

## 2023-09-22 DIAGNOSIS — Z32.01 POSITIVE PREGNANCY TEST: Primary | ICD-10-CM

## 2023-09-22 DIAGNOSIS — N91.4 SECONDARY OLIGOMENORRHEA: ICD-10-CM

## 2023-09-22 DIAGNOSIS — N91.2 AMENORRHEA: Primary | ICD-10-CM

## 2023-09-22 LAB
B-HCG UR QL: POSITIVE
CREAT UR-MCNC: 28 MG/DL (ref 15–325)
CTP QC/QA: YES
PROT UR-MCNC: <7 MG/DL (ref 0–15)
PROT/CREAT UR: NORMAL MG/G{CREAT} (ref 0–0.2)

## 2023-09-22 PROCEDURE — 82570 ASSAY OF URINE CREATININE: CPT | Performed by: FAMILY MEDICINE

## 2023-09-22 PROCEDURE — 87624 HPV HI-RISK TYP POOLED RSLT: CPT | Performed by: FAMILY MEDICINE

## 2023-09-22 PROCEDURE — 99203 OFFICE O/P NEW LOW 30 MIN: CPT | Mod: S$GLB,,, | Performed by: FAMILY MEDICINE

## 2023-09-22 PROCEDURE — 1160F PR REVIEW ALL MEDS BY PRESCRIBER/CLIN PHARMACIST DOCUMENTED: ICD-10-PCS | Mod: CPTII,S$GLB,, | Performed by: FAMILY MEDICINE

## 2023-09-22 PROCEDURE — 3008F PR BODY MASS INDEX (BMI) DOCUMENTED: ICD-10-PCS | Mod: CPTII,S$GLB,, | Performed by: FAMILY MEDICINE

## 2023-09-22 PROCEDURE — 88175 CYTOPATH C/V AUTO FLUID REDO: CPT | Performed by: FAMILY MEDICINE

## 2023-09-22 PROCEDURE — 3079F DIAST BP 80-89 MM HG: CPT | Mod: CPTII,S$GLB,, | Performed by: FAMILY MEDICINE

## 2023-09-22 PROCEDURE — 87591 N.GONORRHOEAE DNA AMP PROB: CPT | Performed by: FAMILY MEDICINE

## 2023-09-22 PROCEDURE — 3074F PR MOST RECENT SYSTOLIC BLOOD PRESSURE < 130 MM HG: ICD-10-PCS | Mod: CPTII,S$GLB,, | Performed by: FAMILY MEDICINE

## 2023-09-22 PROCEDURE — 1159F PR MEDICATION LIST DOCUMENTED IN MEDICAL RECORD: ICD-10-PCS | Mod: CPTII,S$GLB,, | Performed by: FAMILY MEDICINE

## 2023-09-22 PROCEDURE — 3079F PR MOST RECENT DIASTOLIC BLOOD PRESSURE 80-89 MM HG: ICD-10-PCS | Mod: CPTII,S$GLB,, | Performed by: FAMILY MEDICINE

## 2023-09-22 PROCEDURE — 76801 OB US < 14 WKS SINGLE FETUS: CPT | Mod: S$GLB,,, | Performed by: OBSTETRICS & GYNECOLOGY

## 2023-09-22 PROCEDURE — 1160F RVW MEDS BY RX/DR IN RCRD: CPT | Mod: CPTII,S$GLB,, | Performed by: FAMILY MEDICINE

## 2023-09-22 PROCEDURE — 99203 PR OFFICE/OUTPT VISIT, NEW, LEVL III, 30-44 MIN: ICD-10-PCS | Mod: S$GLB,,, | Performed by: FAMILY MEDICINE

## 2023-09-22 PROCEDURE — 1159F MED LIST DOCD IN RCRD: CPT | Mod: CPTII,S$GLB,, | Performed by: FAMILY MEDICINE

## 2023-09-22 PROCEDURE — 76801 OB US < 14 WKS SINGLE FETUS: CPT

## 2023-09-22 PROCEDURE — 3008F BODY MASS INDEX DOCD: CPT | Mod: CPTII,S$GLB,, | Performed by: FAMILY MEDICINE

## 2023-09-22 PROCEDURE — 99999 PR PBB SHADOW E&M-EST. PATIENT-LVL III: CPT | Mod: PBBFAC,,, | Performed by: FAMILY MEDICINE

## 2023-09-22 PROCEDURE — 81025 URINE PREGNANCY TEST: CPT | Mod: S$GLB,,, | Performed by: FAMILY MEDICINE

## 2023-09-22 PROCEDURE — 76801 US OB/GYN PROCEDURE (VIEWPOINT): ICD-10-PCS | Mod: S$GLB,,, | Performed by: OBSTETRICS & GYNECOLOGY

## 2023-09-22 PROCEDURE — 3074F SYST BP LT 130 MM HG: CPT | Mod: CPTII,S$GLB,, | Performed by: FAMILY MEDICINE

## 2023-09-22 PROCEDURE — 99999 PR PBB SHADOW E&M-EST. PATIENT-LVL III: ICD-10-PCS | Mod: PBBFAC,,, | Performed by: FAMILY MEDICINE

## 2023-09-22 PROCEDURE — 81025 POCT URINE PREGNANCY: ICD-10-PCS | Mod: S$GLB,,, | Performed by: FAMILY MEDICINE

## 2023-09-22 PROCEDURE — 87491 CHLMYD TRACH DNA AMP PROBE: CPT | Performed by: FAMILY MEDICINE

## 2023-09-22 PROCEDURE — 87086 URINE CULTURE/COLONY COUNT: CPT | Performed by: FAMILY MEDICINE

## 2023-09-22 NOTE — PROGRESS NOTES
HISTORY OF PRESENT ILLNESS:    Jessika Hassan is a 37 y.o. female, No obstetric history on file.,  No LMP recorded (lmp unknown).  for a routine exam complaining of amenorrhea and positive home UPT. 2 prior elective AB. Pt and partner healthy. Mild pelvic cramping, had spotting once about a week before +upt. Her cycles are irregular. Having breast tenderness, fatigue. No NV. No abn pap or std hx. Works in hotel/tourism. This is the extent of the patient's complaints at this time.     Past Medical History:   Diagnosis Date    Spitz nevus 2016       Past Surgical History:   Procedure Laterality Date    NO PAST SURGERIES         MEDICATIONS AND ALLERGIES:    No current outpatient medications on file.    Review of patient's allergies indicates:  No Known Allergies    Family History   Problem Relation Age of Onset    No Known Problems Mother     No Known Problems Father     Cancer Maternal Grandmother         possible cervical cancer    Diabetes Neg Hx     Stroke Neg Hx        Social History     Socioeconomic History    Marital status: Single   Tobacco Use    Smoking status: Former     Current packs/day: 0.00     Types: Cigarettes     Quit date: 7/15/2015     Years since quittin.1    Smokeless tobacco: Never   Substance and Sexual Activity    Alcohol use: Yes     Alcohol/week: 5.0 standard drinks of alcohol     Types: 5 Glasses of wine per week    Drug use: No    Sexual activity: Yes     Partners: Male   Social History Narrative    She is a  and  of [employer].       COMPREHENSIVE GYN HISTORY:  PAP History: Denies abnormal Paps.  Infection History: Denies STDs. Denies PID.  Benign History: Denies uterine fibroids. + ovarian cysts. Denies endometriosis. Denies other conditions.  Cancer History: Denies cervical cancer. Denies uterine cancer or hyperplasia. Denies ovarian cancer. Denies vulvar cancer or pre-cancer. Denies vaginal cancer or pre-cancer. Denies breast cancer.  "Denies colon cancer.  Sexual Activity History: Reports currently being sexually active  Menstrual History: None.  Contraception: None    ROS:  GENERAL: No weight changes. No swelling. + fatigue. No fever.  CARDIOVASCULAR: No chest pain. No shortness of breath. No leg cramps.   NEUROLOGICAL: No headaches. No vision changes.  BREASTS: + pain. No lumps. No discharge.  ABDOMEN: No pain. No nausea. No vomiting. No diarrhea. No constipation.  REPRODUCTIVE: No abnormal bleeding, spotting once. + mild pelvic cramping  VULVA: No pain. No lesions. No itching.  VAGINA: No relaxation. No itching. No odor. No discharge. No lesions.  URINARY: No incontinence. No nocturia. No frequency. No dysuria.    /84   Ht 5' 3" (1.6 m)   Wt 69.3 kg (152 lb 12.5 oz)   LMP  (LMP Unknown)   BMI 27.06 kg/m²     PE:  Physical Exam:   Constitutional: She appears well-developed and well-nourished. She does not appear ill. No distress.        Pulmonary/Chest: Right breast exhibits no inverted nipple, no mass, no nipple discharge, no skin change, no tenderness and no swelling. Left breast exhibits no inverted nipple, no mass, no nipple discharge, no skin change, no tenderness and no swelling.          Genitourinary:    Urethra, vagina, uterus, right adnexa and left adnexa normal.      Pelvic exam was performed with patient supine.   The external female genitalia was normal.   Genitalia hair distrobution normal .   There is no rash or lesion on the right labia. There is no rash or lesion on the left labia. Cervix is normal. No rectocele, cystocele or unspecified prolapse of vaginal walls in the vagina.    pap smear completedUterus is not tender. Normal urethral meatus.              Neurological: GCS eye subscore is 4. GCS verbal subscore is 5. GCS motor subscore is 6.         PROCEDURES:  UPT Positive  Genprobe  Pap    Results for orders placed or performed in visit on 09/22/23   POCT Urine Pregnancy   Result Value Ref Range    POC Preg Test, " Ur Positive (A) Negative     Acceptable Yes        DIAGNOSIS:  Gyn exam  IUP with stated LMP of No LMP recorded (lmp unknown).    Indication   ========   Indication: Estimation of Gestational Age     History   ======   Risk Factors   History risk factors: AMA     Method   ======   Transabdominal and transvaginal ultrasound examination. 2D Color Doppler, [place] E8. View: Good view     Pregnancy   =========   Pozo pregnancy. Number of fetuses: none     Dating   ======   LMP on: 7/7/2023   GA by LMP 11 w + 0 d   SABINA by LMP: 4/12/2024     Assessment   ==========   Gestational sac: uncertain   GS 3.3 mm 5w 0d Rempen   Location: intrauterine   Yolk sac: not visualized   Amniotic sac: not visualized   Embryo: not visualized     Maternal Structures   ===============   Uterus / Cervix   Uterus: Normal   Cervix: Visualized   Approach: Transvaginal   Ovaries / Tubes / Adnexa   Rt ovary: Normal   Rt ovary D1 47 mm   Rt ovary D2 38 mm   Rt ovary D3 22 mm   Rt ovary Vol 20.6 cmÂ³   Rt ovarian corpus luteum D1 21.0 mm   Rt ovarian corpus luteum D2 16.0 mm   Rt ovarian corpus luteum D3 20.0 mm   Lt ovary: Normal   Lt ovary D1 27 mm   Lt ovary D2 29 mm   Lt ovary D3 23 mm   Lt ovary Vol 9.1 cmÂ³   Cul de Sac / Bladder / Kidneys / Other   Free fluid: Free fluid visualized   Cul de Sac largest pool D1 42.0 mm   Cul de Sac largest pool D2 26.0 mm   Cul de Sac largest pool D3 45.0 mm   Cul de Sac largest pool Vol 25.730 ml     Impression   =========   A probable intrauterine gestational sac is seen. The mean sac diameter (MSD) is 3.3 mm. Neither a yolk sac nor an embryo is seen within the gestational sac. At a MSD of   less than 25 mm, the absence of a yolk sac and embryo may not be pathologic.   The tentative diagnosis, based on ultrasound findings alone, is a pregnancy of uncertain viability.   Other clinical data, as available, should be used in conjunction with ultrasound findings to make a final determination  regarding viability.   If a follow-up study will be ordered to assess viability and to assign SABINA, recommend that this study not be done any sooner than 2 weeks from the date of today's exam.   The maternal adnexae are normal in appearance. The amount of free fluid seen in the pelvis is within normal physiologic range.       PLAN:Routine prenatal care    MEDICATIONS PRESCRIBED:  PNV    LABS AND TESTS ORDERED:  New Ob Labs      1st TRIMESTER COUNSELING: Discussed all, booklet provided  Common complaints of pregnancy  HIV and other routine prenatal tests including  genetic screening  Risk factors identified by prenatal history  Anticipated course of prenatal care  Nutrition and weight gain counseling  Toxoplasmosis precautions (Cats/Raw Meat)  Sexual activity and exercise  Environmental/Work hazards  Travel  Tobacco (Ask, Advise, Assess, Assist, and Arrange), as well as alcohol and drug use  Use of any medications (Including supplements, Vitamins, Herbs, or OTC Drugs)  Indications for Ultrasound  Domestic violence  Seat belt use  Childbirth classes/Hospital facilities     TERATOLOGY COUNSELING: Discussed options for SS, MT21 and carrier screening. Pt will let us know her desires. Discussed time constraints on SS      FOLLOW-UP for a New Ob Visit in  4    weeks with  with repeat dating US at that time (cycles irregular)  -discussed to call clinic or L&D/ER if after hours for pain/bleeding

## 2023-09-22 NOTE — PATIENT INSTRUCTIONS
LABOR AND DELIVERY PHONE NUMBER, [phone] (OPEN , LOCATED ON 6TH FLOOR OF HOSPITAL)  [address] PHONE NUMBER, [phone] (OPEN MON-FRI, 8a-5p)     1) Eat small frequent meals through the day versus three large meals  2) Try ginger ale or sprite to help settle the stomach - you can also take ginger capsules (250mg 4 times per day)  3) Eat crackers or dry toast before getting out of bed in the morning   4) Stay hydrated by drinking plenty of water-do not immediately eat or drink something after vomiting. Give your stomach a rest for 20-30 minutes. Slowly reintroduce ice chips, small sips water, crackers, etc.    5) Try OTC unisom (1/2 tablet) and vitamin B6 - take the 25mg b6 twice a day and then both together at night before bed. This can help with the nausea in the morning and is safe to use during pregnancy.  6) Sea bands (Accupressure wrist bands)    If you are unable to keep anything down and constantly vomiting for more that 24 hours, let the office know so that dehydration can be avoided. We would recommend being seen in the emergency department if this is the case.       Topic  General Pregnancy Information Recommended   (Unless Otherwise Contraindicated Or Restrictions Given To You By Your OB Doctor)      1. Anticipated course of prenatal care      Visits: will be Every 4 wks until 28 weeks, then every 2 weeks until 36 weeks, and then weekly until delivery.   Urine will be collected at each Obstetric visit        2. Nutrition and weight gain    Daily pre-jana vitamin (recommend taking at night)   Additional 300 calories needed daily  No Sushi, hotdogs, unpasteurized products (milk/cheeses). No large fish such as: shark, sarath mackerel, tile, sword fish   Incorporate 12 ounces of smaller seafood/week and no more than 6oz of albacore tuna   Caffeine: 200 mg/day or 2 cups of caffeine/day   Weight gain recommendations are based off of BMI before pregnancy. Generally patients who with normal weight prior  pregnancy it is recommended 25-35 pounds of weight gain during the pregnancy with an estimated weekly gain of 1 pound/wk in 2nd and 3rd Trimester.    3. Toxoplasmosis precautions  If cats are in the home avoid changing litter boxes and if you need to change the litter box recommended you use gloves   4. Sexual Activity  Sexual activity is okay unless you are put on restrictions by your provider. I recommend urinating after intercourse    5. Exercise  Generally pre-pregnancy routine is okay to continue   Drink plenty fluids for hydration   Stop any activity that causes heavy cramping like a period or bright red bleeding and contact your provider  No extreme or contact sports   No exercise on your back for an extended period of time after 20 weeks    6. Hot Tub/Saunas  Avoid hot tubes and saunas    7. Hair Treatments  Because of the lack of scientific studies on the effects of chemical treatments on your hair, we must advise that you do it at your own risk. If you choose to treat your hair, we recommend that you wait until after 12 weeks gestation. At this time there is no reason to believe that normal hair treatment is associated with onsequences to the baby.    8. Vaccines  Influenza vaccine is recommended by CDC during flu season   Tdap (pertussis or whopping cough) recommended each pregnancy between 27 and 36 weeks   Tdap booster recommended for family and other planned direct caregivers    9. Water  Water is an important nutrient in a good diet. However, it cannot be stressed enough that during pregnancy water is essential. The body has increased circulation through blood vessels, and without a large increase in water, pregnant women will be dehydrated. It plays an important role in decreasing constipation, preventing  contractions, decreasing swelling, and preventing dizziness. We recommend that you drink 8-10 glasses of water per day.    10. Smoking/Alcohol/Illicit Drug Use  No safe Level   Can lead to  problems with pregnancy   Growth of the developing fetus    labor (delivery before 37 weeks)    rupture of the membranes (water breaking before 37 weeks)   Premature separation of the placenta (which may cause bleeding)   American College of Obstetricians and Gynecologists endorses abstinence   Can lead to babies with disabilities    11. Environmental or work hazards  Unless otherwise restricted you may continue work throughout the pregnancy   Notify your provider of any work hazards or chemical exposure concerns   12. Travel    Safe to travel up to 35 weeks   Continue to wear a seatbelt and airbags are still recommended   Drink plenty fluids   Blood clots are a concern during pregnancy with long travel. Recommend compression stockings and moving around at least every 2 hours and staying hydrated.    13. Use of medications, vitamins, herbs, OTC drugs    Any medications not on the list provided to you from our clinic or given to you by one of our providers we recommend calling to make sure the medication is safe for you and baby.    14. Domestic Violence    Please notify office immediately of any concerns or violence so that we can help direct you to assistance needed   [place] [employer]: [phone]    15. Childbirth classes    List of Childbirth classes from [facility] is available    16. Selecting a Pediatrician  Selecting a pediatrician before delivery is recommended  You can interview pediatricians before delivery    17. Fetal Monitoring    A simple test of your babys well-being is a kick count. After 26 weeks, fetal motion of any kind should be monitored. Further discussion at that time   18.  Labor Signs    Water break, leaking fluids from Vagina prior 37 weeks  Regular contractions, Contractions that are more than 5-6/hour, getting stronger and painful with lower back pain, does not go away with rest and fluids    19. Postpartum Family Planning    Multiple  options available from short term methods to long term reversible and irreversible methods   Discuss with provider as you get closer to delivery    20. Dental    It is recommended that you get an annual dental cleaning    21. Breastfeeding    Classes offered at [facility] and it is recommended to take a class    22. Lifting In 2013, the National [place] for Occupational Safety and Health (NIOSH) published clinical guidelines for occupational lifting in uncomplicated pregnancies. The recommended weight limits are based on gestational age, intermittent versus repetitive lifting, time (hours/day) spent lifting, and lifting height from floor and distance in 3 front of body. In this guideline, the maximum permissible weight for a woman less than 20 weeks of gestation performing infrequent lifting is 36 pounds (16 kgs) and the maximum permissible weight at ?20 weeks is 26 pounds (12 kgs). For repetitive lifting ?1 hour/day, the maximum weights in the first and second half of pregnancy are 18 pounds (8 kgs) and 13 pounds (6 kgs), respectively, and for repetitive lifting <1 hour/day, the maximum weights are 30 pounds (14 kgs) and 22 pounds (10 kgs), respectively. Although not based on high quality evidence, these guidelines are a reasonable reference for counseling pregnant women     23. Scheduling and Provider Availability    Your Obstetric Doctor is usually here weekly but not every day. We recommend you make 3-4 advanced appointments at a time to accommodate your personal needs and work/school obligations.   We ask that you come 15 minutes prior your scheduled appointment.   For same day appointments (not routine appointments) there is a Nurse Practitioner or another obstetric provider available. Please let the  aware you are an OB patient requesting a same day appointment.      24. Recommended Phone Alexand[facility]      MEDICATIONS IN PREGNANCY     While some medications are considered safe to take  during pregnancy, the effects of other medications on your unborn baby are unknown. Therefore, it is very important to pay special attention to medications you take while you are pregnant.   If you were taking prescription medications before you became pregnant, please ask your health care provider about continuing these medications as soon as you find out that you are pregnant. Do not stop taking any medications without discussing with your health care provider. Your health care provider will weigh the benefits and risks to your pregnancy when making his or her recommendation. With some medications, the risk of not taking them may be more serious than the potential risk of taking them.   If you are prescribed any new medication, please inform your health care provider that you are pregnant. Be sure to discuss the risks and benefits of the newly prescribed medication with your health care provider before taking the medication. We are always available to answer any questions about new medications and how they relate to your pregnancy.     Are Alternative Pregnancy Medicine Therapies Safe?   Many pregnant women believe natural products can be safely used to relieve nausea, backache, and other annoying symptoms of pregnancy, but many of these so-called natural products have not been tested for their safety and effectiveness. Therefore, it is very important to check with your health care provider before taking any alternative therapies. She will not recommend a product or therapy until it is shown to be safe and effective.     Which Over the Counter Drugs Are Safe?   Prenatal vitamins, now available without a prescription, are safe and important to take during pregnancy. Ask your health care provider about the safety of taking other vitamins, herbal remedies and supplements during pregnancy. Most herbal preparations and supplements have not been proven to be safe during pregnancy. Generally, you should not take any  over-the-counter medication unless it is necessary. The following medications and home remedies have no known harmful effects during pregnancy when taken according to the package directions. If you want to know about the safety of any other medications not listed here, please contact your health care provider.      Problem:  Safe to Take:    Pain relief, headache, and fever  Acetaminophen - Tylenol, Anacin Aspirin-Free    Heartburn  Acid neutralizers - Maalox, Mylanta, Rolaids, Tums, Gaviscon   Histamine-blockers - Pepcid, Zantac, Prilosec    Gas pains and bloating  Simethicone - Gas-X, Maalox Anti-Gas, Mylanta Gas, Mylicon    Nausea  Kayleigh - beverages, tablets, candies   Vitamin B6   Emetrol (if not diabetic)   Sea bands   Anti-histamines - Sleep-mraguerite, Benadryl, Bonnine, Dramamine    Cough  Guaifenesin (expectorant) - Hytuss, Mucinex, Robitussin   Dextromethorphan (antitussive) - Benylin, Delsym, Scot-Tussin DM   Guaifenesin plus dextromethorphan - Benylin Expectorant, Robitussin DM    Congestion  Pseudoephedrine - Sudafed, Actifed, Dristan, Neosynephrine   Vicks VapoRub   Saline nasal drops or spray    Sore throat  Throat lozenges - Sucrets, Cepacol, Cepastat, Ricola   Chloroseptic Spray   Warm salt/water gargle    Allergy relief  Chlorpheniramine - Chlor-Trimeton, Triaminic   Loratadine - Alavert, Claritin, Tavist ND, Triaminic Allerchews   Cetirizine - Zyrtec   Diphenhydramine -Benadryl, Diphenhist    Rashes  Hydrocortisone cream or ointment   Caladryl lotion or cream   Benadryl cream   Oatmeal bath (Aveeno)    Diarrhea  Loperamide - Imodium, Kaopectate, Maalox Anti-Diarrheal, Pepto Bismol    Constipation  Fiber supplements - Metamucil, Citrucel, Fiberall/Fibercon, Benefiber   Stool softeners - Colace, Senekot, Dulcolax   Milk of Magnesia    Hemorrhoids  Warm baths   Witch hazel preparations - Tucks medicated pads   Steroid preparations - Anusol-HC, Preparation H    Insomnia  Diphenhydramine - Benadryl, Unisom  SleepGels, Nytol, Sominex   Doxylamine succinate - Unisom Nighttime Sleep-Aid    First-aid ointments  Cortaid, Lanacort, Polysporin, Bacitracin, Neosporin    Yeast infections  Call office for appointment      Connected MOM   Using Wireless Technology to Manage Your Prenatal Health   Congratulations! Your team here at [facility] is excited for the upcoming addition to your family and is ready to support you over the course of your pregnancy. One of the ways we are prepared to help you is through our exciting new Digital Medicine Program, Connected MOM. Connected MOM stands for Connected Maternity Online Monitoring and is offered free of charge as a way to help our expectant mothers manage their pregnancy with fewer visits to the obstetrician.    In the fast-paced environment we live in, patients demand convenient, reliable access to healthcare at their fingertips. Recommended by The American College of Obstetricians and Gynecologists (ACOG), the standard prenatal care model for low-risk pregnancies can average anywhere between 12-14 in-office prenatal visits.    Through this innovative program, participating mothers-to-be receive a wireless scale, wireless blood pressure cuff and an at-home urine protein test kit. Through the use of a smartphone, patients can easily send their weight, blood pressure readings and urine protein test results digitally in real-time from the comfort of their own homes. Results are sent directly to their [facility] account, the systems online patient portal which connects directly to the patients Electronic Medical Record. A specialized Connected MOM care team - comprised of the patients obstetrician, a dedicated health  and a  - reviews the data and provides medical recommendations as needed. This allows expectant mothers to receive care proactively, wherever they are, while minimizing the need for in-person visits and thus minimizing  disruption to their regular daily activities.    How it Works At the initial prenatal visit, interested patients can work with their obstetrician to sign up for the program. After the appointment, expectant mothers can head to the [employer], a first-of-its-kind retail experience created by [facility] offering the latest in cutting-edge, interactive healthcare technology, to receive a Connected MOM kit containing all of the digital tools needed for remote monitoring. Once enrolled, patients weigh themselves regularly and take their blood pressure once a week. Instead of coming to three office appointments at weeks 20, 30, 37 the patient will have the appointment at home. They will take their blood pressure, weight and perform three at-home urine protein tests at these home visits. Results are directly transmitted into the EMR, and notifications and messages from the care team are communicated via [facility].     TECH SUPPORT [phone]  [email or web address]      Chromosomal testing  The sequential screen is a test done around 11-13 weeks that consists of an ultrasound that measures the nuchal fold (neck thickness) of baby and blood work on the same day. You get a second set of labs done a few weeks later after 15 weeks. Based on all three of these results, they are able to tell you if you are considered to be low risk or high risk regarding neural tube defects and chromosomal abnormalities like Down Syndrome. If you are at all interested, I usually place the order today so we do not miss the window. Someone will give you a phone call in a week or two to schedule this. If you do not want it, just tell them no thank you. This test is typically covered by your insurance and is performed by the Maternal Fetal Medicine (MFM) department here at [facility]. It will not tell you the sex of the baby.     OR     2.  Call [phone] to see about coverage and any out of pocket costs regarding the [id number] (MT21) testing.  This is done any day after 11 weeks and is blood work only. It checks for any chromosomal abnormalities like Down Syndrome. You can also find out the sex of the baby if you choose to know. Once you find out coverage and decide to proceed, send either myself or your doctor a message and we can see what date you can do it. It is done at our second floor lab at [facility].

## 2023-09-23 LAB
C TRACH DNA SPEC QL NAA+PROBE: NOT DETECTED
N GONORRHOEA DNA SPEC QL NAA+PROBE: NOT DETECTED

## 2023-09-24 LAB — BACTERIA UR CULT: NO GROWTH

## 2023-09-27 LAB
HPV HR 12 DNA SPEC QL NAA+PROBE: NEGATIVE
HPV16 AG SPEC QL: NEGATIVE
HPV18 DNA SPEC QL NAA+PROBE: NEGATIVE

## 2023-09-29 LAB
FINAL PATHOLOGIC DIAGNOSIS: NORMAL
Lab: NORMAL

## 2023-10-03 ENCOUNTER — PATIENT MESSAGE (OUTPATIENT)
Dept: OBSTETRICS AND GYNECOLOGY | Facility: CLINIC | Age: 38
End: 2023-10-03
Payer: COMMERCIAL

## 2023-10-03 DIAGNOSIS — O21.9 NAUSEA AND VOMITING IN PREGNANCY: Primary | ICD-10-CM

## 2023-10-03 RX ORDER — DOXYLAMINE SUCCINATE AND PYRIDOXINE HYDROCHLORIDE, DELAYED RELEASE TABLETS 10 MG/10 MG 10; 10 MG/1; MG/1
2 TABLET, DELAYED RELEASE ORAL NIGHTLY PRN
Qty: 60 TABLET | Refills: 6 | Status: SHIPPED | OUTPATIENT
Start: 2023-10-03 | End: 2024-03-18

## 2023-10-16 ENCOUNTER — TELEPHONE (OUTPATIENT)
Dept: OBSTETRICS AND GYNECOLOGY | Facility: CLINIC | Age: 38
End: 2023-10-16
Payer: COMMERCIAL

## 2023-10-17 ENCOUNTER — TELEPHONE (OUTPATIENT)
Dept: OBSTETRICS AND GYNECOLOGY | Facility: CLINIC | Age: 38
End: 2023-10-17
Payer: COMMERCIAL

## 2023-10-17 NOTE — TELEPHONE ENCOUNTER
No answer left vm    ----- Message from Katarina Smith MA sent at 10/17/2023  9:34 AM CDT -----  Regarding: FW: returning call  Please advise    ----- Message -----  From: Agatha Simon  Sent: 10/17/2023   9:29 AM CDT  To: Laura Lieberman Staff  Subject: returning call                                     Type:  Patient Returning Call    Who Called:AYESHA UNDERWOOD [[id number]]    Who Left Message for Patient: TAMMY Sanchez     Does the patient know what this is regarding?her [employer] message    Best Call Back Number:[phone]    Additional Information:

## 2023-10-18 DIAGNOSIS — O21.9 NAUSEA AND VOMITING IN PREGNANCY: Primary | ICD-10-CM

## 2023-10-18 RX ORDER — ONDANSETRON 4 MG/1
4 TABLET, ORALLY DISINTEGRATING ORAL DAILY
Qty: 30 TABLET | Refills: 3 | Status: SHIPPED | OUTPATIENT
Start: 2023-10-18 | End: 2023-10-20

## 2023-10-20 ENCOUNTER — INITIAL PRENATAL (OUTPATIENT)
Dept: OBSTETRICS AND GYNECOLOGY | Facility: CLINIC | Age: 38
End: 2023-10-20
Payer: COMMERCIAL

## 2023-10-20 ENCOUNTER — HOSPITAL ENCOUNTER (OUTPATIENT)
Dept: PERINATAL CARE | Facility: OTHER | Age: 38
Discharge: HOME | End: 2023-10-20
Attending: FAMILY MEDICINE
Payer: COMMERCIAL

## 2023-10-20 VITALS
BODY MASS INDEX: 27.57 KG/M2 | WEIGHT: 155.63 LBS | DIASTOLIC BLOOD PRESSURE: 62 MMHG | SYSTOLIC BLOOD PRESSURE: 104 MMHG

## 2023-10-20 DIAGNOSIS — O09.511 AMA (ADVANCED MATERNAL AGE) PRIMIGRAVIDA 35+, FIRST TRIMESTER: Primary | ICD-10-CM

## 2023-10-20 DIAGNOSIS — Z3A.01 5 WEEKS GESTATION OF PREGNANCY: ICD-10-CM

## 2023-10-20 DIAGNOSIS — O21.9 NAUSEA AND VOMITING DURING PREGNANCY: ICD-10-CM

## 2023-10-20 PROCEDURE — 76801 US OB/GYN PROCEDURE (VIEWPOINT): ICD-10-PCS | Mod: 26,,, | Performed by: OBSTETRICS & GYNECOLOGY

## 2023-10-20 PROCEDURE — 0500F PR INITIAL PRENATAL CARE VISIT: ICD-10-PCS | Mod: CPTII,S$GLB,, | Performed by: STUDENT IN AN ORGANIZED HEALTH CARE EDUCATION/TRAINING PROGRAM

## 2023-10-20 PROCEDURE — 76801 OB US < 14 WKS SINGLE FETUS: CPT | Mod: 26,,, | Performed by: OBSTETRICS & GYNECOLOGY

## 2023-10-20 PROCEDURE — 0500F INITIAL PRENATAL CARE VISIT: CPT | Mod: CPTII,S$GLB,, | Performed by: STUDENT IN AN ORGANIZED HEALTH CARE EDUCATION/TRAINING PROGRAM

## 2023-10-20 PROCEDURE — 99999 PR PBB SHADOW E&M-EST. PATIENT-LVL II: CPT | Mod: PBBFAC,,, | Performed by: STUDENT IN AN ORGANIZED HEALTH CARE EDUCATION/TRAINING PROGRAM

## 2023-10-20 PROCEDURE — 76801 OB US < 14 WKS SINGLE FETUS: CPT

## 2023-10-20 PROCEDURE — 99999 PR PBB SHADOW E&M-EST. PATIENT-LVL II: ICD-10-PCS | Mod: PBBFAC,,, | Performed by: STUDENT IN AN ORGANIZED HEALTH CARE EDUCATION/TRAINING PROGRAM

## 2023-10-20 RX ORDER — ONDANSETRON 4 MG/1
8 TABLET, ORALLY DISINTEGRATING ORAL EVERY 6 HOURS PRN
Qty: 30 TABLET | Refills: 2 | Status: SHIPPED | OUTPATIENT
Start: 2023-10-20 | End: 2024-03-18

## 2023-10-20 NOTE — LETTER
October 20, 2023      [facility]  [address] [address]  Phone: [phone]  Fax: [phone]       Patient: Jessika Hassan   YOB: 1985      To Whom It May Concern:    Tana Hassan  is under our care at [facility] for pregnancy.Please excuse our patient for the days of 10/16-10/17. The patient may return to work/school on 10/18/2023 with restrictions. It is recommended that expectant mothers that work in standing positions at repetitive tasks for 3 hours or more should take frequent rest breaks. Expectant mothers that sit for extended periods of time should stand and walk around every two hours. This will improve circulation, reduce eyestrain and relax back muscles. Please limit 15-20 pounds for pushing, pulling and lifting.  If you have any questions or concerns, or if I can be of further assistance, please do not hesitate to contact me.    Sincerely,    Rosalinda Null MD

## 2023-10-20 NOTE — PROGRESS NOTES
Pregnancy dating, labs, ultrasound reports, prenatal testing, and problem list; prior records and results; and available outside records were reviewed and updated in EMR.  Pertinent findings were noted below.    Reason for Visit   Initial Prenatal Visit    HPI   37 y.o., at 8w4d by Estimated Date of Delivery: 5/27/24    Here with partner for initial OB visit. Really struggling with nausea and vomiting. Just started taking the zofran and feeling optimistic about how she feels today.  Mild cramping, no bleeding. Occasional headaches. She is taking unisom/B6 and PNV. Interested in cfDNA. No medical problems.    Exam   /62   Wt 70.6 kg (155 lb 10.3 oz)   LMP  (LMP Unknown)   BMI 27.57 kg/m²     GENERAL: No acute distress  ABD: Gravid    Assessment and Plan   AMA (advanced maternal age) primigravida 35+, first trimester    Nausea and vomiting during pregnancy    Other orders  -     ondansetron (ZOFRAN-ODT) 4 MG TbDL; Take 2 tablets (8 mg total) by mouth every 6 (six) hours as needed (Nausea).  Dispense: 30 tablet; Refill: 2      -- Dating scan today WNL.  -- 1T labs reviewed, WNL.  -- Zofran refilled. Encouraged her to reach out with worsening symptoms.  -- Cost information provided for cfDNA.  -- Discussed starting aspirin at 12-16 weeks.  -- RTC in 4 weeks.      Rosalinda Null MD

## 2023-11-14 ENCOUNTER — PATIENT MESSAGE (OUTPATIENT)
Dept: MATERNAL FETAL MEDICINE | Facility: CLINIC | Age: 38
End: 2023-11-14
Payer: COMMERCIAL

## 2023-11-14 ENCOUNTER — LAB VISIT (OUTPATIENT)
Dept: LAB | Facility: OTHER | Age: 38
End: 2023-11-14
Attending: STUDENT IN AN ORGANIZED HEALTH CARE EDUCATION/TRAINING PROGRAM
Payer: COMMERCIAL

## 2023-11-14 ENCOUNTER — ROUTINE PRENATAL (OUTPATIENT)
Dept: OBSTETRICS AND GYNECOLOGY | Facility: CLINIC | Age: 38
End: 2023-11-14

## 2023-11-14 VITALS
SYSTOLIC BLOOD PRESSURE: 100 MMHG | WEIGHT: 158.94 LBS | BODY MASS INDEX: 28.16 KG/M2 | DIASTOLIC BLOOD PRESSURE: 70 MMHG

## 2023-11-14 DIAGNOSIS — O21.9 NAUSEA/VOMITING IN PREGNANCY: ICD-10-CM

## 2023-11-14 DIAGNOSIS — O09.511 AMA (ADVANCED MATERNAL AGE) PRIMIGRAVIDA 35+, FIRST TRIMESTER: Primary | ICD-10-CM

## 2023-11-14 LAB
ALBUMIN SERPL BCP-MCNC: 3.6 G/DL (ref 3.5–5.2)
ALP SERPL-CCNC: 27 U/L (ref 55–135)
ALT SERPL W/O P-5'-P-CCNC: 38 U/L (ref 10–44)
ANION GAP SERPL CALC-SCNC: 8 MMOL/L (ref 8–16)
AST SERPL-CCNC: 20 U/L (ref 10–40)
BILIRUB SERPL-MCNC: 0.3 MG/DL (ref 0.1–1)
BUN SERPL-MCNC: 9 MG/DL (ref 6–20)
CALCIUM SERPL-MCNC: 9.3 MG/DL (ref 8.7–10.5)
CHLORIDE SERPL-SCNC: 106 MMOL/L (ref 95–110)
CO2 SERPL-SCNC: 24 MMOL/L (ref 23–29)
CREAT SERPL-MCNC: 0.7 MG/DL (ref 0.5–1.4)
EST. GFR  (NO RACE VARIABLE): >60 ML/MIN/1.73 M^2
GLUCOSE SERPL-MCNC: 77 MG/DL (ref 70–110)
MAGNESIUM SERPL-MCNC: 1.9 MG/DL (ref 1.6–2.6)
PHOSPHATE SERPL-MCNC: 3.5 MG/DL (ref 2.7–4.5)
POTASSIUM SERPL-SCNC: 3.6 MMOL/L (ref 3.5–5.1)
PROT SERPL-MCNC: 6.9 G/DL (ref 6–8.4)
SODIUM SERPL-SCNC: 138 MMOL/L (ref 136–145)

## 2023-11-14 PROCEDURE — 84100 ASSAY OF PHOSPHORUS: CPT | Performed by: STUDENT IN AN ORGANIZED HEALTH CARE EDUCATION/TRAINING PROGRAM

## 2023-11-14 PROCEDURE — 83735 ASSAY OF MAGNESIUM: CPT | Performed by: STUDENT IN AN ORGANIZED HEALTH CARE EDUCATION/TRAINING PROGRAM

## 2023-11-14 PROCEDURE — 80053 COMPREHEN METABOLIC PANEL: CPT | Performed by: STUDENT IN AN ORGANIZED HEALTH CARE EDUCATION/TRAINING PROGRAM

## 2023-11-14 PROCEDURE — 0502F PR SUBSEQUENT PRENATAL CARE: ICD-10-PCS | Mod: S$GLB,,, | Performed by: STUDENT IN AN ORGANIZED HEALTH CARE EDUCATION/TRAINING PROGRAM

## 2023-11-14 PROCEDURE — 0502F SUBSEQUENT PRENATAL CARE: CPT | Mod: S$GLB,,, | Performed by: STUDENT IN AN ORGANIZED HEALTH CARE EDUCATION/TRAINING PROGRAM

## 2023-11-14 PROCEDURE — 99999 PR PBB SHADOW E&M-EST. PATIENT-LVL II: CPT | Mod: PBBFAC,,, | Performed by: STUDENT IN AN ORGANIZED HEALTH CARE EDUCATION/TRAINING PROGRAM

## 2023-11-14 PROCEDURE — 99999 PR PBB SHADOW E&M-EST. PATIENT-LVL II: ICD-10-PCS | Mod: PBBFAC,,, | Performed by: STUDENT IN AN ORGANIZED HEALTH CARE EDUCATION/TRAINING PROGRAM

## 2023-11-14 PROCEDURE — 36415 COLL VENOUS BLD VENIPUNCTURE: CPT | Performed by: STUDENT IN AN ORGANIZED HEALTH CARE EDUCATION/TRAINING PROGRAM

## 2023-11-14 RX ORDER — METOCLOPRAMIDE 5 MG/1
5 TABLET ORAL 3 TIMES DAILY
Qty: 90 TABLET | Refills: 1 | Status: SHIPPED | OUTPATIENT
Start: 2023-11-14 | End: 2024-03-18

## 2023-11-14 RX ORDER — ASPIRIN 81 MG/1
81 TABLET ORAL DAILY
Qty: 90 TABLET | Refills: 3 | Status: SHIPPED | OUTPATIENT
Start: 2023-11-14 | End: 2024-03-18

## 2023-11-14 NOTE — PROGRESS NOTES
Pregnancy dating, labs, ultrasound reports, prenatal testing, and problem list; prior records and results; and available outside records were reviewed and updated in EMR.  Pertinent findings were noted below.    Reason for Visit   Routine Prenatal Visit    HPI   38 y.o., at 12w1d by Estimated Date of Delivery: 24    Here for PANCHITO, still feeling really sick. Did not get the relief from zofran that she was hoping for, has resigned herself to feeling miserable. Only keeping down toast, rice, oatmeal. Hesitates to eat because she is already thinking about it coming back up. Trying to stay hydrated, mixing water with gatorade, etc. No cramping or bleeding. Having headaches, which she associates with the zofran. Also having constipation, having to use suppositories.     Exam   /70   Wt 72.1 kg (158 lb 15.2 oz)   LMP  (LMP Unknown)   BMI 28.16 kg/m²     TW pounds    GENERAL: No acute distress  ABD: Gravid    Assessment and Plan   AMA (advanced maternal age) primigravida 35+, first trimester  -     US MFM Procedure (Viewpoint)-Future; Future; Expected date: 2023      -- Has not tried reglan for N/V. Rx sent. Will also check electrolytes today. ED precautions discussed.   -- They do want cfDNA for genetic screening. Drawn today.  -- Dicussed and enrolled in connected mom.  -- Recommend aspirin for preE ppx.  -- Anatomy scan ordered.  -- RTC in 4 weeks.       Rosalinda Null MD

## 2023-11-16 ENCOUNTER — PATIENT MESSAGE (OUTPATIENT)
Dept: MATERNAL FETAL MEDICINE | Facility: CLINIC | Age: 38
End: 2023-11-16
Payer: COMMERCIAL

## 2023-11-16 ENCOUNTER — PATIENT MESSAGE (OUTPATIENT)
Dept: OBSTETRICS AND GYNECOLOGY | Facility: CLINIC | Age: 38
End: 2023-11-16
Payer: COMMERCIAL

## 2023-11-17 ENCOUNTER — PATIENT MESSAGE (OUTPATIENT)
Dept: MATERNAL FETAL MEDICINE | Facility: CLINIC | Age: 38
End: 2023-11-17
Payer: COMMERCIAL

## 2023-11-18 ENCOUNTER — PATIENT MESSAGE (OUTPATIENT)
Dept: ADMINISTRATIVE | Facility: OTHER | Age: 38
End: 2023-11-18
Payer: COMMERCIAL

## 2023-11-21 ENCOUNTER — PATIENT MESSAGE (OUTPATIENT)
Dept: MATERNAL FETAL MEDICINE | Facility: CLINIC | Age: 38
End: 2023-11-21
Payer: COMMERCIAL

## 2023-11-22 ENCOUNTER — TELEPHONE (OUTPATIENT)
Dept: OBSTETRICS AND GYNECOLOGY | Facility: CLINIC | Age: 38
End: 2023-11-22
Payer: COMMERCIAL

## 2023-11-24 ENCOUNTER — PATIENT MESSAGE (OUTPATIENT)
Dept: MATERNAL FETAL MEDICINE | Facility: CLINIC | Age: 38
End: 2023-11-24
Payer: COMMERCIAL

## 2023-11-30 ENCOUNTER — PATIENT MESSAGE (OUTPATIENT)
Dept: MATERNAL FETAL MEDICINE | Facility: CLINIC | Age: 38
End: 2023-11-30
Payer: COMMERCIAL

## 2023-12-11 ENCOUNTER — PATIENT MESSAGE (OUTPATIENT)
Dept: OTHER | Facility: OTHER | Age: 38
End: 2023-12-11
Payer: COMMERCIAL

## 2023-12-18 ENCOUNTER — PATIENT MESSAGE (OUTPATIENT)
Dept: OTHER | Facility: OTHER | Age: 38
End: 2023-12-18
Payer: COMMERCIAL

## 2023-12-19 ENCOUNTER — TELEPHONE (OUTPATIENT)
Dept: OBSTETRICS AND GYNECOLOGY | Facility: HOSPITAL | Age: 38
End: 2023-12-19
Payer: COMMERCIAL

## 2023-12-19 ENCOUNTER — ROUTINE PRENATAL (OUTPATIENT)
Dept: OBSTETRICS AND GYNECOLOGY | Facility: CLINIC | Age: 38
End: 2023-12-19
Payer: COMMERCIAL

## 2023-12-19 VITALS — DIASTOLIC BLOOD PRESSURE: 72 MMHG | BODY MASS INDEX: 29.76 KG/M2 | WEIGHT: 168 LBS | SYSTOLIC BLOOD PRESSURE: 116 MMHG

## 2023-12-19 DIAGNOSIS — O09.512 PRIMIGRAVIDA OF ADVANCED MATERNAL AGE IN SECOND TRIMESTER: Primary | ICD-10-CM

## 2023-12-19 DIAGNOSIS — R82.90 ABNORMAL FINDING IN URINE: ICD-10-CM

## 2023-12-19 PROCEDURE — 99999 PR PBB SHADOW E&M-EST. PATIENT-LVL III: ICD-10-PCS | Mod: PBBFAC,,, | Performed by: STUDENT IN AN ORGANIZED HEALTH CARE EDUCATION/TRAINING PROGRAM

## 2023-12-19 PROCEDURE — 0502F PR SUBSEQUENT PRENATAL CARE: ICD-10-PCS | Mod: CPTII,S$GLB,, | Performed by: STUDENT IN AN ORGANIZED HEALTH CARE EDUCATION/TRAINING PROGRAM

## 2023-12-19 PROCEDURE — 99999 PR PBB SHADOW E&M-EST. PATIENT-LVL III: CPT | Mod: PBBFAC,,, | Performed by: STUDENT IN AN ORGANIZED HEALTH CARE EDUCATION/TRAINING PROGRAM

## 2023-12-19 PROCEDURE — 87086 URINE CULTURE/COLONY COUNT: CPT | Performed by: STUDENT IN AN ORGANIZED HEALTH CARE EDUCATION/TRAINING PROGRAM

## 2023-12-19 PROCEDURE — 0502F SUBSEQUENT PRENATAL CARE: CPT | Mod: CPTII,S$GLB,, | Performed by: STUDENT IN AN ORGANIZED HEALTH CARE EDUCATION/TRAINING PROGRAM

## 2023-12-19 NOTE — PROGRESS NOTES
Pregnancy dating, labs, ultrasound reports, prenatal testing, and problem list; prior records and results; and available outside records were reviewed and updated in EMR.  Pertinent findings were noted below.    Reason for Visit   Routine Prenatal Visit    HPI   38 y.o., at 17w1d by Estimated Date of Delivery: 5/27/24    Here for PANCHITO. Nausea has significantly improved since last visit. Now tolerating most foods. Having some reflux and heartburn, mostly with red gravy. Not taking anything. Also had a week of terrible headaches, which seems to be a little better now. Mild cramping/stretching. No bleeding. Starting to have sciatic type pain bilaterally. Interested in meeting with CNM group.      Exam   /72   Wt 76.2 kg (167 lb 15.9 oz)   LMP  (LMP Unknown)   BMI 29.76 kg/m²     TWG: 15 pounds    GENERAL: No acute distress  ABD: Gravid  BSUS: Active fetus    Assessment and Plan   Primigravida of advanced maternal age in second trimester    Abnormal finding in urine  -     CULTURE, URINE      -- Anatomy scan scheduled for 1/2.  -- Discussed symptomatic management for above issues.  -- Nitrates on urine dip today. Asymptomatic. Will send culture.  -- RTC in 4 weeks.      Rosalinda Null MD

## 2023-12-20 LAB — BACTERIA UR CULT: NO GROWTH

## 2024-01-02 ENCOUNTER — PROCEDURE VISIT (OUTPATIENT)
Dept: MATERNAL FETAL MEDICINE | Facility: CLINIC | Age: 39
End: 2024-01-02
Payer: COMMERCIAL

## 2024-01-02 DIAGNOSIS — Z36.89 ENCOUNTER FOR ULTRASOUND TO CHECK FETAL GROWTH: Primary | ICD-10-CM

## 2024-01-02 DIAGNOSIS — O09.511 AMA (ADVANCED MATERNAL AGE) PRIMIGRAVIDA 35+, FIRST TRIMESTER: ICD-10-CM

## 2024-01-02 PROCEDURE — 76811 OB US DETAILED SNGL FETUS: CPT | Mod: S$GLB,,, | Performed by: OBSTETRICS & GYNECOLOGY

## 2024-01-02 PROCEDURE — 76817 TRANSVAGINAL US OBSTETRIC: CPT | Mod: S$GLB,,, | Performed by: OBSTETRICS & GYNECOLOGY

## 2024-01-04 ENCOUNTER — PATIENT MESSAGE (OUTPATIENT)
Dept: OBSTETRICS AND GYNECOLOGY | Facility: CLINIC | Age: 39
End: 2024-01-04
Payer: COMMERCIAL

## 2024-01-08 ENCOUNTER — PATIENT MESSAGE (OUTPATIENT)
Dept: OTHER | Facility: OTHER | Age: 39
End: 2024-01-08
Payer: COMMERCIAL

## 2024-01-11 ENCOUNTER — TELEPHONE (OUTPATIENT)
Dept: OBSTETRICS AND GYNECOLOGY | Facility: CLINIC | Age: 39
End: 2024-01-11
Payer: COMMERCIAL

## 2024-01-12 ENCOUNTER — ROUTINE PRENATAL (OUTPATIENT)
Dept: OBSTETRICS AND GYNECOLOGY | Facility: CLINIC | Age: 39
End: 2024-01-12
Payer: COMMERCIAL

## 2024-01-12 VITALS
BODY MASS INDEX: 31.24 KG/M2 | DIASTOLIC BLOOD PRESSURE: 74 MMHG | SYSTOLIC BLOOD PRESSURE: 114 MMHG | WEIGHT: 176.38 LBS

## 2024-01-12 DIAGNOSIS — O09.512 PRIMIGRAVIDA OF ADVANCED MATERNAL AGE IN SECOND TRIMESTER: Primary | ICD-10-CM

## 2024-01-12 PROCEDURE — 0502F SUBSEQUENT PRENATAL CARE: CPT | Mod: CPTII,S$GLB,, | Performed by: STUDENT IN AN ORGANIZED HEALTH CARE EDUCATION/TRAINING PROGRAM

## 2024-01-12 PROCEDURE — 99999 PR PBB SHADOW E&M-EST. PATIENT-LVL II: CPT | Mod: PBBFAC,,, | Performed by: STUDENT IN AN ORGANIZED HEALTH CARE EDUCATION/TRAINING PROGRAM

## 2024-01-12 NOTE — PROGRESS NOTES
Pregnancy dating, labs, ultrasound reports, prenatal testing, and problem list; prior records and results; and available outside records were reviewed and updated in EMR.  Pertinent findings were noted below.    Reason for Visit   Routine Prenatal Visit    HPI   38 y.o., at 20w4d by Estimated Date of Delivery: 24    Here for PANCHITO. Feeling a lot better. Had a pretty intense bout of round ligament pain. Also still having some headaches. Constipated but having a bowel movement every other day. Trying to manage with hydration and fiber.  No contractions, vaginal bleeding, leakage of fluid. Reports feeling stronger fetal movement.      Exam   /74   Wt 80 kg (176 lb 5.9 oz)   LMP  (LMP Unknown)   BMI 31.24 kg/m²     TW pounds    GENERAL: No acute distress  ABD: Gravid    Assessment and Plan   Primigravida of advanced maternal age in second trimester  -     OB Glucose Screen; Future; Expected date: 2024  -     CBC W/ AUTO DIFFERENTIAL; Future; Expected date: 2024      -- Anatomy scan reviewed, low lying placenta. Incomplete anatomy. Follow up US on .  -- 2T labs at next visit.  -- RTC in 4 weeks.      Rosalinda Null MD

## 2024-01-30 ENCOUNTER — PROCEDURE VISIT (OUTPATIENT)
Dept: MATERNAL FETAL MEDICINE | Facility: CLINIC | Age: 39
End: 2024-01-30
Payer: COMMERCIAL

## 2024-01-30 DIAGNOSIS — Z36.89 ENCOUNTER FOR ULTRASOUND TO ASSESS FETAL GROWTH: Primary | ICD-10-CM

## 2024-01-30 DIAGNOSIS — O09.511 AMA (ADVANCED MATERNAL AGE) PRIMIGRAVIDA 35+, FIRST TRIMESTER: ICD-10-CM

## 2024-01-30 DIAGNOSIS — Z36.89 ENCOUNTER FOR ULTRASOUND TO CHECK FETAL GROWTH: ICD-10-CM

## 2024-01-30 PROCEDURE — 76816 OB US FOLLOW-UP PER FETUS: CPT | Mod: S$GLB,,, | Performed by: STUDENT IN AN ORGANIZED HEALTH CARE EDUCATION/TRAINING PROGRAM

## 2024-02-05 ENCOUNTER — PATIENT MESSAGE (OUTPATIENT)
Dept: OTHER | Facility: OTHER | Age: 39
End: 2024-02-05
Payer: COMMERCIAL

## 2024-02-05 ENCOUNTER — LAB VISIT (OUTPATIENT)
Dept: LAB | Facility: HOSPITAL | Age: 39
End: 2024-02-05
Attending: STUDENT IN AN ORGANIZED HEALTH CARE EDUCATION/TRAINING PROGRAM
Payer: COMMERCIAL

## 2024-02-05 ENCOUNTER — ROUTINE PRENATAL (OUTPATIENT)
Dept: OBSTETRICS AND GYNECOLOGY | Facility: CLINIC | Age: 39
End: 2024-02-05
Payer: COMMERCIAL

## 2024-02-05 VITALS
BODY MASS INDEX: 32.22 KG/M2 | WEIGHT: 181.88 LBS | DIASTOLIC BLOOD PRESSURE: 68 MMHG | SYSTOLIC BLOOD PRESSURE: 122 MMHG

## 2024-02-05 DIAGNOSIS — O09.512 PRIMIGRAVIDA OF ADVANCED MATERNAL AGE IN SECOND TRIMESTER: ICD-10-CM

## 2024-02-05 DIAGNOSIS — O09.512 PRIMIGRAVIDA OF ADVANCED MATERNAL AGE IN SECOND TRIMESTER: Primary | ICD-10-CM

## 2024-02-05 LAB
BASOPHILS # BLD AUTO: 0.03 K/UL (ref 0–0.2)
BASOPHILS NFR BLD: 0.3 % (ref 0–1.9)
DIFFERENTIAL METHOD BLD: ABNORMAL
EOSINOPHIL # BLD AUTO: 0.1 K/UL (ref 0–0.5)
EOSINOPHIL NFR BLD: 1.1 % (ref 0–8)
ERYTHROCYTE [DISTWIDTH] IN BLOOD BY AUTOMATED COUNT: 13 % (ref 11.5–14.5)
GLUCOSE SERPL-MCNC: 99 MG/DL (ref 70–140)
HCT VFR BLD AUTO: 33.8 % (ref 37–48.5)
HGB BLD-MCNC: 11.3 G/DL (ref 12–16)
IMM GRANULOCYTES # BLD AUTO: 0.31 K/UL (ref 0–0.04)
IMM GRANULOCYTES NFR BLD AUTO: 2.7 % (ref 0–0.5)
LYMPHOCYTES # BLD AUTO: 1.7 K/UL (ref 1–4.8)
LYMPHOCYTES NFR BLD: 15.2 % (ref 18–48)
MCH RBC QN AUTO: 33.2 PG (ref 27–31)
MCHC RBC AUTO-ENTMCNC: 33.4 G/DL (ref 32–36)
MCV RBC AUTO: 99 FL (ref 82–98)
MONOCYTES # BLD AUTO: 0.7 K/UL (ref 0.3–1)
MONOCYTES NFR BLD: 5.8 % (ref 4–15)
NEUTROPHILS # BLD AUTO: 8.5 K/UL (ref 1.8–7.7)
NEUTROPHILS NFR BLD: 74.9 % (ref 38–73)
NRBC BLD-RTO: 0 /100 WBC
PLATELET # BLD AUTO: 260 K/UL (ref 150–450)
PMV BLD AUTO: 10.8 FL (ref 9.2–12.9)
RBC # BLD AUTO: 3.4 M/UL (ref 4–5.4)
WBC # BLD AUTO: 11.35 K/UL (ref 3.9–12.7)

## 2024-02-05 PROCEDURE — 99999 PR PBB SHADOW E&M-EST. PATIENT-LVL III: CPT | Mod: PBBFAC,,, | Performed by: STUDENT IN AN ORGANIZED HEALTH CARE EDUCATION/TRAINING PROGRAM

## 2024-02-05 PROCEDURE — 85025 COMPLETE CBC W/AUTO DIFF WBC: CPT | Performed by: STUDENT IN AN ORGANIZED HEALTH CARE EDUCATION/TRAINING PROGRAM

## 2024-02-05 PROCEDURE — 82950 GLUCOSE TEST: CPT | Performed by: STUDENT IN AN ORGANIZED HEALTH CARE EDUCATION/TRAINING PROGRAM

## 2024-02-05 PROCEDURE — 36415 COLL VENOUS BLD VENIPUNCTURE: CPT | Mod: PN | Performed by: STUDENT IN AN ORGANIZED HEALTH CARE EDUCATION/TRAINING PROGRAM

## 2024-02-05 PROCEDURE — 0502F SUBSEQUENT PRENATAL CARE: CPT | Mod: CPTII,S$GLB,, | Performed by: STUDENT IN AN ORGANIZED HEALTH CARE EDUCATION/TRAINING PROGRAM

## 2024-02-05 NOTE — PROGRESS NOTES
Pregnancy dating, labs, ultrasound reports, prenatal testing, and problem list; prior records and results; and available outside records were reviewed and updated in EMR.  Pertinent findings were noted below.    Reason for Visit   Routine Prenatal Visit    HPI   38 y.o., at 24w0d by Estimated Date of Delivery: 24    Here for PANCHITO, feeling good.  No contractions, vaginal bleeding, leakage of fluid. Reports normal fetal movement.      Exam   /68   Wt 82.5 kg (181 lb 14.1 oz)   LMP  (LMP Unknown)   BMI 32.22 kg/m²     TW pounds    GENERAL: No acute distress  ABD: Gravid    Assessment and Plan   Primigravida of advanced maternal age in second trimester    Other orders  -     (In Office Administered) Tdap Vaccine      -- Reviewed US. Normal anatomy and low lying placenta resolved. Follow up US scheduled for .  -- 2T labs today.  -- Discussed Tdap at next visit, RSV at 32-36 weeks.  -- RTC in 4 weeks.      Rosalinda Null MD

## 2024-02-19 ENCOUNTER — PATIENT MESSAGE (OUTPATIENT)
Dept: OTHER | Facility: OTHER | Age: 39
End: 2024-02-19
Payer: COMMERCIAL

## 2024-03-04 ENCOUNTER — ROUTINE PRENATAL (OUTPATIENT)
Dept: OBSTETRICS AND GYNECOLOGY | Facility: CLINIC | Age: 39
End: 2024-03-04
Payer: COMMERCIAL

## 2024-03-04 ENCOUNTER — PATIENT MESSAGE (OUTPATIENT)
Dept: OTHER | Facility: OTHER | Age: 39
End: 2024-03-04
Payer: COMMERCIAL

## 2024-03-04 VITALS — DIASTOLIC BLOOD PRESSURE: 76 MMHG | SYSTOLIC BLOOD PRESSURE: 122 MMHG | BODY MASS INDEX: 33 KG/M2 | WEIGHT: 186.31 LBS

## 2024-03-04 DIAGNOSIS — O09.513 PRIMIGRAVIDA OF ADVANCED MATERNAL AGE IN THIRD TRIMESTER: Primary | ICD-10-CM

## 2024-03-04 PROCEDURE — 0502F SUBSEQUENT PRENATAL CARE: CPT | Mod: CPTII,S$GLB,, | Performed by: STUDENT IN AN ORGANIZED HEALTH CARE EDUCATION/TRAINING PROGRAM

## 2024-03-04 PROCEDURE — 99999 PR PBB SHADOW E&M-EST. PATIENT-LVL II: CPT | Mod: PBBFAC,,, | Performed by: STUDENT IN AN ORGANIZED HEALTH CARE EDUCATION/TRAINING PROGRAM

## 2024-03-04 NOTE — PROGRESS NOTES
Pregnancy dating, labs, ultrasound reports, prenatal testing, and problem list; prior records and results; and available outside records were reviewed and updated in EMR.  Pertinent findings were noted below.    Reason for Visit   Routine Prenatal Visit    HPI   38 y.o., at 28w0d by Estimated Date of Delivery: 24    Here for PANCHITO. Occasional mild cramps, nothing concerning. No regular contractions, vaginal bleeding, leakage of fluid. Reports normal fetal movement. Considering vasectomy for contraception. Plans to breastfeed.      Exam   /76   Wt 84.5 kg (186 lb 4.6 oz)   LMP  (LMP Unknown)   BMI 33.00 kg/m²     TW pounds    GENERAL: No acute distress  ABD: Gravid    Assessment and Plan   Primigravida of advanced maternal age in third trimester    Care and examination of lactating mother  -     BREAST PUMP FOR HOME USE      -- Breast pump Rx given.  -- Growth US scheduled for  due to AMA.  -- Will schedule Tdap.  -- RTC in 2-4 weeks.      Rosalinda Null MD

## 2024-03-11 ENCOUNTER — PATIENT MESSAGE (OUTPATIENT)
Dept: OBSTETRICS AND GYNECOLOGY | Facility: CLINIC | Age: 39
End: 2024-03-11
Payer: COMMERCIAL

## 2024-03-18 ENCOUNTER — ROUTINE PRENATAL (OUTPATIENT)
Dept: OBSTETRICS AND GYNECOLOGY | Facility: CLINIC | Age: 39
End: 2024-03-18
Payer: COMMERCIAL

## 2024-03-18 ENCOUNTER — PATIENT MESSAGE (OUTPATIENT)
Dept: OTHER | Facility: OTHER | Age: 39
End: 2024-03-18
Payer: COMMERCIAL

## 2024-03-18 VITALS — DIASTOLIC BLOOD PRESSURE: 70 MMHG | SYSTOLIC BLOOD PRESSURE: 118 MMHG | BODY MASS INDEX: 33.04 KG/M2 | WEIGHT: 186.5 LBS

## 2024-03-18 DIAGNOSIS — R82.90 ABNORMAL URINE FINDING: ICD-10-CM

## 2024-03-18 DIAGNOSIS — O09.513 PRIMIGRAVIDA OF ADVANCED MATERNAL AGE IN THIRD TRIMESTER: Primary | ICD-10-CM

## 2024-03-18 PROCEDURE — 99999 PR PBB SHADOW E&M-EST. PATIENT-LVL II: CPT | Mod: PBBFAC,,, | Performed by: STUDENT IN AN ORGANIZED HEALTH CARE EDUCATION/TRAINING PROGRAM

## 2024-03-18 PROCEDURE — 87086 URINE CULTURE/COLONY COUNT: CPT | Performed by: STUDENT IN AN ORGANIZED HEALTH CARE EDUCATION/TRAINING PROGRAM

## 2024-03-18 PROCEDURE — 0502F SUBSEQUENT PRENATAL CARE: CPT | Mod: CPTII,S$GLB,, | Performed by: STUDENT IN AN ORGANIZED HEALTH CARE EDUCATION/TRAINING PROGRAM

## 2024-03-20 LAB
BACTERIA UR CULT: NORMAL
BACTERIA UR CULT: NORMAL

## 2024-04-01 ENCOUNTER — CLINICAL SUPPORT (OUTPATIENT)
Dept: OBSTETRICS AND GYNECOLOGY | Facility: CLINIC | Age: 39
End: 2024-04-01
Payer: COMMERCIAL

## 2024-04-01 ENCOUNTER — TELEPHONE (OUTPATIENT)
Dept: OBSTETRICS AND GYNECOLOGY | Facility: CLINIC | Age: 39
End: 2024-04-01
Payer: COMMERCIAL

## 2024-04-01 ENCOUNTER — ROUTINE PRENATAL (OUTPATIENT)
Dept: OBSTETRICS AND GYNECOLOGY | Facility: CLINIC | Age: 39
End: 2024-04-01
Payer: COMMERCIAL

## 2024-04-01 ENCOUNTER — PROCEDURE VISIT (OUTPATIENT)
Dept: MATERNAL FETAL MEDICINE | Facility: CLINIC | Age: 39
End: 2024-04-01
Payer: COMMERCIAL

## 2024-04-01 VITALS
SYSTOLIC BLOOD PRESSURE: 104 MMHG | BODY MASS INDEX: 34.17 KG/M2 | WEIGHT: 192.88 LBS | DIASTOLIC BLOOD PRESSURE: 74 MMHG

## 2024-04-01 DIAGNOSIS — O09.511 AMA (ADVANCED MATERNAL AGE) PRIMIGRAVIDA 35+, FIRST TRIMESTER: ICD-10-CM

## 2024-04-01 DIAGNOSIS — Z34.83 ENCOUNTER FOR SUPERVISION OF OTHER NORMAL PREGNANCY IN THIRD TRIMESTER: ICD-10-CM

## 2024-04-01 DIAGNOSIS — Z3A.32 32 WEEKS GESTATION OF PREGNANCY: Primary | ICD-10-CM

## 2024-04-01 DIAGNOSIS — Z23 NEED FOR TDAP VACCINATION: Primary | ICD-10-CM

## 2024-04-01 DIAGNOSIS — R82.90 ABNORMAL URINALYSIS: ICD-10-CM

## 2024-04-01 DIAGNOSIS — Z36.89 ENCOUNTER FOR ULTRASOUND TO ASSESS FETAL GROWTH: ICD-10-CM

## 2024-04-01 PROCEDURE — 76817 TRANSVAGINAL US OBSTETRIC: CPT | Mod: S$GLB,,, | Performed by: OBSTETRICS & GYNECOLOGY

## 2024-04-01 PROCEDURE — 99999 PR PBB SHADOW E&M-EST. PATIENT-LVL III: CPT | Mod: PBBFAC,,, | Performed by: FAMILY MEDICINE

## 2024-04-01 PROCEDURE — 90715 TDAP VACCINE 7 YRS/> IM: CPT | Mod: S$GLB,,, | Performed by: STUDENT IN AN ORGANIZED HEALTH CARE EDUCATION/TRAINING PROGRAM

## 2024-04-01 PROCEDURE — 0502F SUBSEQUENT PRENATAL CARE: CPT | Mod: CPTII,S$GLB,, | Performed by: FAMILY MEDICINE

## 2024-04-01 PROCEDURE — 76816 OB US FOLLOW-UP PER FETUS: CPT | Mod: S$GLB,,, | Performed by: OBSTETRICS & GYNECOLOGY

## 2024-04-01 PROCEDURE — 87086 URINE CULTURE/COLONY COUNT: CPT | Performed by: FAMILY MEDICINE

## 2024-04-01 PROCEDURE — 99999 PR PBB SHADOW E&M-EST. PATIENT-LVL I: CPT | Mod: PBBFAC,,,

## 2024-04-01 PROCEDURE — 90471 IMMUNIZATION ADMIN: CPT | Mod: S$GLB,,, | Performed by: STUDENT IN AN ORGANIZED HEALTH CARE EDUCATION/TRAINING PROGRAM

## 2024-04-01 NOTE — PROGRESS NOTES
Here for routine OB appt at 32w0d, with no complaints. Growth US today, AC3+ weeks, FH normal. Pt unsure if she'd like to do repeat growth scan in 4 weeks due to cost, discussed can consider if abnormal FH.  Reports good FM.  Denies LOF, denies VB, denies contractions. No HAs. Tdap done. A POS. +nitrates on UA, no dysuria, hematuria, fever. Will send for urine cx. Pt mentions wanting to switch to ABC, will message.   Reviewed warning signs of Labor and Preeclampsia.  Daily FM counts reinforced.  F/U scheduled 2 weeks

## 2024-04-01 NOTE — TELEPHONE ENCOUNTER
I called pt to schedule initial transfer appt. I schedule pt for Wednesday April 24, 2024 @ 9;00am. Pt agreed with date and time.

## 2024-04-01 NOTE — PATIENT INSTRUCTIONS
[facility] DELIVERY PHONE NUMBER, [phone] (OPEN 24/7, LOCATED ON 6TH FLOOR OF HOSPITAL)  [address] PHONE NUMBER, [phone] (OPEN MON-FRI, 8a-5p)

## 2024-04-02 DIAGNOSIS — O36.63X0 EXCESSIVE FETAL GROWTH AFFECTING MANAGEMENT OF PREGNANCY IN THIRD TRIMESTER, SINGLE OR UNSPECIFIED FETUS: Primary | ICD-10-CM

## 2024-04-03 LAB — BACTERIA UR CULT: NO GROWTH

## 2024-04-08 ENCOUNTER — PATIENT MESSAGE (OUTPATIENT)
Dept: MATERNAL FETAL MEDICINE | Facility: CLINIC | Age: 39
End: 2024-04-08
Payer: COMMERCIAL

## 2024-04-10 ENCOUNTER — PATIENT MESSAGE (OUTPATIENT)
Dept: OBSTETRICS AND GYNECOLOGY | Facility: CLINIC | Age: 39
End: 2024-04-10
Payer: COMMERCIAL

## 2024-04-11 RX ORDER — HYDROCORTISONE ACETATE 25 MG/1
25 SUPPOSITORY RECTAL 2 TIMES DAILY
Qty: 20 SUPPOSITORY | Refills: 0 | Status: SHIPPED | OUTPATIENT
Start: 2024-04-11 | End: 2024-04-21

## 2024-04-11 RX ORDER — HYDROCORTISONE 25 MG/G
CREAM TOPICAL 2 TIMES DAILY
Qty: 20 G | Refills: 0 | Status: ON HOLD | OUTPATIENT
Start: 2024-04-11 | End: 2024-05-31 | Stop reason: HOSPADM

## 2024-04-17 ENCOUNTER — LAB VISIT (OUTPATIENT)
Dept: LAB | Facility: OTHER | Age: 39
End: 2024-04-17
Attending: STUDENT IN AN ORGANIZED HEALTH CARE EDUCATION/TRAINING PROGRAM
Payer: COMMERCIAL

## 2024-04-17 ENCOUNTER — ROUTINE PRENATAL (OUTPATIENT)
Dept: OBSTETRICS AND GYNECOLOGY | Facility: CLINIC | Age: 39
End: 2024-04-17
Payer: COMMERCIAL

## 2024-04-17 VITALS
BODY MASS INDEX: 34.09 KG/M2 | SYSTOLIC BLOOD PRESSURE: 140 MMHG | DIASTOLIC BLOOD PRESSURE: 78 MMHG | WEIGHT: 192.44 LBS

## 2024-04-17 DIAGNOSIS — O36.63X0 EXCESSIVE FETAL GROWTH AFFECTING MANAGEMENT OF PREGNANCY IN THIRD TRIMESTER, SINGLE OR UNSPECIFIED FETUS: ICD-10-CM

## 2024-04-17 DIAGNOSIS — O09.513 PRIMIGRAVIDA OF ADVANCED MATERNAL AGE IN THIRD TRIMESTER: Primary | ICD-10-CM

## 2024-04-17 DIAGNOSIS — O16.3 ELEVATED BLOOD PRESSURE AFFECTING PREGNANCY IN THIRD TRIMESTER, ANTEPARTUM: ICD-10-CM

## 2024-04-17 DIAGNOSIS — O09.513 PRIMIGRAVIDA OF ADVANCED MATERNAL AGE IN THIRD TRIMESTER: ICD-10-CM

## 2024-04-17 LAB
BASOPHILS # BLD AUTO: 0.02 K/UL (ref 0–0.2)
BASOPHILS NFR BLD: 0.2 % (ref 0–1.9)
DIFFERENTIAL METHOD BLD: ABNORMAL
EOSINOPHIL # BLD AUTO: 0.1 K/UL (ref 0–0.5)
EOSINOPHIL NFR BLD: 0.4 % (ref 0–8)
ERYTHROCYTE [DISTWIDTH] IN BLOOD BY AUTOMATED COUNT: 14.5 % (ref 11.5–14.5)
HCT VFR BLD AUTO: 38.2 % (ref 37–48.5)
HGB BLD-MCNC: 12.7 G/DL (ref 12–16)
HIV 1+2 AB+HIV1 P24 AG SERPL QL IA: NEGATIVE
IMM GRANULOCYTES # BLD AUTO: 0.35 K/UL (ref 0–0.04)
IMM GRANULOCYTES NFR BLD AUTO: 2.9 % (ref 0–0.5)
LYMPHOCYTES # BLD AUTO: 2.2 K/UL (ref 1–4.8)
LYMPHOCYTES NFR BLD: 18.2 % (ref 18–48)
MCH RBC QN AUTO: 32.2 PG (ref 27–31)
MCHC RBC AUTO-ENTMCNC: 33.2 G/DL (ref 32–36)
MCV RBC AUTO: 97 FL (ref 82–98)
MONOCYTES # BLD AUTO: 0.9 K/UL (ref 0.3–1)
MONOCYTES NFR BLD: 7.1 % (ref 4–15)
NEUTROPHILS # BLD AUTO: 8.5 K/UL (ref 1.8–7.7)
NEUTROPHILS NFR BLD: 71.2 % (ref 38–73)
NRBC BLD-RTO: 0 /100 WBC
PLATELET # BLD AUTO: 225 K/UL (ref 150–450)
PMV BLD AUTO: 10.5 FL (ref 9.2–12.9)
RBC # BLD AUTO: 3.94 M/UL (ref 4–5.4)
TREPONEMA PALLIDUM IGG+IGM AB [PRESENCE] IN SERUM OR PLASMA BY IMMUNOASSAY: NONREACTIVE
WBC # BLD AUTO: 11.92 K/UL (ref 3.9–12.7)

## 2024-04-17 PROCEDURE — 85025 COMPLETE CBC W/AUTO DIFF WBC: CPT | Performed by: STUDENT IN AN ORGANIZED HEALTH CARE EDUCATION/TRAINING PROGRAM

## 2024-04-17 PROCEDURE — 0502F SUBSEQUENT PRENATAL CARE: CPT | Mod: CPTII,S$GLB,, | Performed by: STUDENT IN AN ORGANIZED HEALTH CARE EDUCATION/TRAINING PROGRAM

## 2024-04-17 PROCEDURE — 36415 COLL VENOUS BLD VENIPUNCTURE: CPT | Performed by: STUDENT IN AN ORGANIZED HEALTH CARE EDUCATION/TRAINING PROGRAM

## 2024-04-17 PROCEDURE — 99999 PR PBB SHADOW E&M-EST. PATIENT-LVL II: CPT | Mod: PBBFAC,,, | Performed by: STUDENT IN AN ORGANIZED HEALTH CARE EDUCATION/TRAINING PROGRAM

## 2024-04-17 PROCEDURE — 87389 HIV-1 AG W/HIV-1&-2 AB AG IA: CPT | Performed by: STUDENT IN AN ORGANIZED HEALTH CARE EDUCATION/TRAINING PROGRAM

## 2024-04-17 PROCEDURE — 86593 SYPHILIS TEST NON-TREP QUANT: CPT | Performed by: STUDENT IN AN ORGANIZED HEALTH CARE EDUCATION/TRAINING PROGRAM

## 2024-04-17 RX ORDER — ASPIRIN 81 MG/1
81 TABLET ORAL DAILY
Status: ON HOLD | COMMUNITY
Start: 2024-04-17 | End: 2024-05-31 | Stop reason: HOSPADM

## 2024-04-17 NOTE — PROGRESS NOTES
Pregnancy dating, labs, ultrasound reports, prenatal testing, and problem list; prior records and results; and available outside records were reviewed and updated in EMR.  Pertinent findings were noted below.    Reason for Visit   Routine Prenatal Visit    HPI   38 y.o., at 34w2d by Estimated Date of Delivery: 24    Here for PANCHITO. Starting have trouble working due to fatigue, getting short of breath easily. She is still trying to stay active, going for 3 20-minute walks per day. +BHC.  No regular contractions, vaginal bleeding, leakage of fluid. Reports normal fetal movement. Has a pretty intense episode of dizziness that was isolated. More frequent headaches but still mild. Resolve with hydration. No vision changes, other preE symptoms.      Exam   BP (!) 140/78   Wt 87.3 kg (192 lb 7.4 oz)   LMP  (LMP Unknown)   BMI 34.09 kg/m²     TW pounds    GENERAL: No acute distress  ABD: Gravid    Assessment and Plan   Primigravida of advanced maternal age in third trimester  -     Treponema Pallidium Antibodies IgG, IgM; Future; Expected date: 2024  -     CBC Auto Differential; Future; Expected date: 2024  -     HIV 1/2 Ag/Ab (4th Gen); Future; Expected date: 2024    Elevated blood pressure affecting pregnancy in third trimester, antepartum  -     COMPREHENSIVE METABOLIC PANEL; Future; Expected date: 2024    Excessive fetal growth affecting management of pregnancy in third trimester, single or unspecified fetus    Other orders  -     aspirin (ECOTRIN) 81 MG EC tablet; Take 1 tablet (81 mg total) by mouth once daily.      -- BP mild range. Repeat WNL. Will add CMP to 3T labs.  -- Discussed 32 week US. EFW WNL but AC measuring 3 weeks ahead. Discussed rationale for repeat scan, they will consider. Concerned about cost. Fundal height normal today.  -- Interested in transitioning care to ABC. Appointment scheduled for next week.      Rosalinda Null MD

## 2024-04-22 ENCOUNTER — PATIENT MESSAGE (OUTPATIENT)
Dept: OTHER | Facility: OTHER | Age: 39
End: 2024-04-22
Payer: COMMERCIAL

## 2024-04-24 ENCOUNTER — PATIENT MESSAGE (OUTPATIENT)
Dept: OBSTETRICS AND GYNECOLOGY | Facility: CLINIC | Age: 39
End: 2024-04-24

## 2024-04-24 ENCOUNTER — INITIAL PRENATAL (OUTPATIENT)
Dept: OBSTETRICS AND GYNECOLOGY | Facility: CLINIC | Age: 39
End: 2024-04-24
Payer: COMMERCIAL

## 2024-04-24 ENCOUNTER — PATIENT MESSAGE (OUTPATIENT)
Dept: MATERNAL FETAL MEDICINE | Facility: CLINIC | Age: 39
End: 2024-04-24
Payer: COMMERCIAL

## 2024-04-24 VITALS
WEIGHT: 194.25 LBS | BODY MASS INDEX: 34.41 KG/M2 | DIASTOLIC BLOOD PRESSURE: 81 MMHG | SYSTOLIC BLOOD PRESSURE: 131 MMHG

## 2024-04-24 DIAGNOSIS — Z34.90 FUNDAL HEIGHT HIGH FOR DATES: ICD-10-CM

## 2024-04-24 DIAGNOSIS — O44.42 LOW-LYING PLACENTA IN SECOND TRIMESTER: ICD-10-CM

## 2024-04-24 DIAGNOSIS — O26.00 EXCESSIVE WEIGHT GAIN AFFECTING PREGNANCY: ICD-10-CM

## 2024-04-24 DIAGNOSIS — Z3A.35 35 WEEKS GESTATION OF PREGNANCY: Primary | ICD-10-CM

## 2024-04-24 PROBLEM — N91.2 AMENORRHEA: Status: RESOLVED | Noted: 2023-09-22 | Resolved: 2024-04-24

## 2024-04-24 PROBLEM — Z3A.01 5 WEEKS GESTATION OF PREGNANCY: Status: RESOLVED | Noted: 2023-10-20 | Resolved: 2024-04-24

## 2024-04-24 PROCEDURE — 0500F INITIAL PRENATAL CARE VISIT: CPT | Mod: CPTII,S$GLB,, | Performed by: ADVANCED PRACTICE MIDWIFE

## 2024-04-24 PROCEDURE — 99999 PR PBB SHADOW E&M-EST. PATIENT-LVL II: CPT | Mod: PBBFAC,,, | Performed by: ADVANCED PRACTICE MIDWIFE

## 2024-04-24 NOTE — PROGRESS NOTES
38 y.o.,  at 35w2d by 8w ultrasound    Patient presents today for a transfer initial prenatal visit. Patient reports they have been receiving regular, routine prenatal care at [facility] with Dr. Null.    Here without concerns.    SOCIAL HISTORY: Denies emotional/mental/physical/sexual violence or abuse. Feels safe at home. Accompanied today by her , Ronald. Living with father of baby, Ronald. Planned pregnancy. First baby for both.     Denies tobacco, alcohol, or substance use during the pregnancy.      Patient reports prepregnancy weight: 152lbs. TW lbs     Patient reports most recent pap smear: 2023, results: NILM; HR HPV negative    Denies UCs/cramping, no LOF or VB. Reports active/normal fetal movement    Past Medical History:   Diagnosis Date    Spitz nevus 2016     Past Surgical History:   Procedure Laterality Date    NO PAST SURGERIES       OB History    Para Term  AB Living   3       2     SAB IAB Ectopic Multiple Live Births     2            # Outcome Date GA Lbr Mitch/2nd Weight Sex Type Anes PTL Lv   3 Current            2 IAB  7w0d    TAB      1 IAB  7w0d    TAB          PHYSICAL EXAM  /81   Wt 88.1 kg (194 lb 3.6 oz)   LMP  (LMP Unknown)   BMI 34.41 kg/m²   GENERAL: No acute distress  HEENT: Normocephalic, atruamatic  NEURO: Alert and oriented x3  PULMONARY: Non-labored respiration; no tachypnea  ABD: Soft, gravid, nontender, S>D    ASSESSMENT AND PLAN  35 weeks gestation of pregnancy  -     US MFM Procedure ([facility]point)-Future; Future  Patient was oriented to practice and progression of routine prenatal care. Reviewed New OB Pregnancy packet & questions answered. After hours contact info given.  Patient does have copy of prenatal records here with her today.  Initial labs and dating u/s reviewed.   Discussed diet, along with substances & foods to avoid in pregnancy (i.e. sushi, fish that are high in mercury, deli meat, and unpasteurized cheeses).    Discussed prenatal vitamin options (i.e. stool softener, DHA).    Review exercise precautions in pregnancy, along with recommendations. Patient reports does exercise regularly: 8-10,000 steps/day; doing an exercise program 5x/wk.   Pt reports consuming at least 80-100g protein daily - adding protein powder to food for increased supplementation. Encouraged continued high protein diet from whole/lean foods and may decrease protein powder addition.  Discussed 36w labs with next visit    Low-lying placenta in second trimester - RESOLVED    Fundal height high for dates  -     US MFM Procedure (Viewpoint)-Future; Future  Pt reports was hesitant to do follow up ultrasound without indication, as was billed for last MFM ultrasound. Has completed SPADD visit and in the process of working everything out with billing  Discussed recommendation/indication for F/U growth as FH>dates today, along with indication from prior growth ultrasound. Will plan repeat next week.    Excessive weight gain affecting pregnancy  -     US MFM Procedure (Viewpoint)-Future; Future    Prepregnancy BMI 27  Counseled today on proper weight gain based on the [employer]'s recommendations based on pre-pregnancy weight.  -- Discussed IOM recommended weight gain of:   Weight category Pre pre BMI  Recommended TWG   Underweight Less than 18.5 28-40    Normal Weight 18.5-24.9  25-35    Overweight 25-29.9  15-25    Obese   30 and greater  11-20     Vaccine Counseling:    - Has not yet received Covid-19 vaccine for 2023/24 season. Education regarding vaccine recommendations in pregnancy.   - Education regarding CDC recommendations for TDAP in pregnancy, reviewed risks/benefits to this. Patient reports she has received it.    Birth Center Risk Assessment: 0- Meets birth center guidelines    CNM management in ABC  CNM management on L&D  Consultation with OB to develop  plan of care  Collaborative CNM/OB management with delivery on L&D  Permanent  referral of care to MD    Education regarding warning signs.    Follow up: 1 wks, call or present sooner prn.     Marti Smith CNM/KRISTINE

## 2024-04-30 ENCOUNTER — PROCEDURE VISIT (OUTPATIENT)
Dept: MATERNAL FETAL MEDICINE | Facility: CLINIC | Age: 39
End: 2024-04-30
Payer: COMMERCIAL

## 2024-04-30 DIAGNOSIS — Z3A.35 35 WEEKS GESTATION OF PREGNANCY: ICD-10-CM

## 2024-04-30 DIAGNOSIS — O26.00 EXCESSIVE WEIGHT GAIN AFFECTING PREGNANCY: ICD-10-CM

## 2024-04-30 DIAGNOSIS — Z34.90 FUNDAL HEIGHT HIGH FOR DATES: ICD-10-CM

## 2024-04-30 PROCEDURE — 76816 OB US FOLLOW-UP PER FETUS: CPT | Mod: S$GLB,,, | Performed by: OBSTETRICS & GYNECOLOGY

## 2024-05-02 ENCOUNTER — ROUTINE PRENATAL (OUTPATIENT)
Dept: OBSTETRICS AND GYNECOLOGY | Facility: CLINIC | Age: 39
End: 2024-05-02
Payer: COMMERCIAL

## 2024-05-02 VITALS
HEART RATE: 93 BPM | WEIGHT: 193.88 LBS | DIASTOLIC BLOOD PRESSURE: 79 MMHG | SYSTOLIC BLOOD PRESSURE: 130 MMHG | BODY MASS INDEX: 34.35 KG/M2

## 2024-05-02 DIAGNOSIS — Z3A.36 36 WEEKS GESTATION OF PREGNANCY: Primary | ICD-10-CM

## 2024-05-02 DIAGNOSIS — O09.513 PRIMIGRAVIDA OF ADVANCED MATERNAL AGE IN THIRD TRIMESTER: ICD-10-CM

## 2024-05-02 PROCEDURE — 87081 CULTURE SCREEN ONLY: CPT

## 2024-05-02 PROCEDURE — 0502F SUBSEQUENT PRENATAL CARE: CPT | Mod: CPTII,S$GLB,,

## 2024-05-02 PROCEDURE — 99999 PR PBB SHADOW E&M-EST. PATIENT-LVL III: CPT | Mod: PBBFAC,,,

## 2024-05-02 NOTE — PROGRESS NOTES
38 y.o.,  at 36w3d here today for routine ob appt.    Complaints today: pelvic pressure.  Doing well without concerns. Denies LOF, VB, and cramping or regular contractions. Denies dysuria and any other s/s UTI. Denies HA, visual disturbances, and upper abdominal pain. Reports good fetal movement.    Verified no history of genital HSV.  TW lbs     PHYSICAL EXAM  GENERAL: No acute distress  HEENT: Normocephalic, atraumatic  NEURO: Alert and oriented x3  PULMONARY: Non-labored respiration; no tachypnea  ABD: Soft, gravid, nontender. Cephalic per Leopold's    ASSESSMENT AND PLAN  36 weeks gestation of pregnancy  -     STREP B SCREEN, VAGINAL / RECTAL      GBS collected today. Reviewed Allergies: Patient has no known allergies.  Reviewed [place] department of Health recommendation for repeat HIV/RPR today; patient already had, neg       Reviewed warning signs, normal FM,  labor precautions, and how/when to call.      Follow-up: 1 week, call or present sooner PRN    Jenny Eldridge CNM/KRISTINE

## 2024-05-04 LAB — BACTERIA SPEC AEROBE CULT: NORMAL

## 2024-05-06 ENCOUNTER — ROUTINE PRENATAL (OUTPATIENT)
Dept: OBSTETRICS AND GYNECOLOGY | Facility: CLINIC | Age: 39
End: 2024-05-06
Payer: COMMERCIAL

## 2024-05-06 VITALS
WEIGHT: 195.31 LBS | BODY MASS INDEX: 34.6 KG/M2 | DIASTOLIC BLOOD PRESSURE: 81 MMHG | SYSTOLIC BLOOD PRESSURE: 120 MMHG | HEART RATE: 97 BPM

## 2024-05-06 DIAGNOSIS — Z3A.37 37 WEEKS GESTATION OF PREGNANCY: Primary | ICD-10-CM

## 2024-05-06 DIAGNOSIS — O36.63X0 FETAL MACROSOMIA DURING PREGNANCY IN THIRD TRIMESTER, SINGLE OR UNSPECIFIED FETUS: ICD-10-CM

## 2024-05-06 PROCEDURE — 0502F SUBSEQUENT PRENATAL CARE: CPT | Mod: CPTII,S$GLB,,

## 2024-05-06 PROCEDURE — 99999 PR PBB SHADOW E&M-EST. PATIENT-LVL III: CPT | Mod: PBBFAC,,,

## 2024-05-06 NOTE — PROGRESS NOTES
38 y.o.,  at 37w0d presents today for routine OB appt.  Denies regular UCs, LOF or VB. Reports good FM. Doing well without concerns.  TW lbs     PHYSICAL EXAM  GENERAL: No acute distress  HEENT: Normocephalic, atraumatic  NEURO: Alert and oriented x3  PULMONARY: Non-labored respiration; no tachypnea  ABD: Soft, gravid, nontender.    ASSESSMENT AND PLAN  37 weeks gestation of pregnancy    Fetal macrosomia during pregnancy in third trimester, single or unspecified fetus      Possible evolving fetal macrosomia per growth US- AC >3 weeks ahead. EFW is AGA.  Delivery consents previously signed  Reviewed GBS neg and need for intrapartum abx  Reviewed repeat 3rd trimester HIV/RPR neg  Education regarding labor precautions.  Reviewed warning signs, normal FM, and how/when to call.      Follow-up: 1 week, call or present sooner PRN    Amina Victoria CNM/KRISTINE

## 2024-05-14 ENCOUNTER — ROUTINE PRENATAL (OUTPATIENT)
Dept: OBSTETRICS AND GYNECOLOGY | Facility: CLINIC | Age: 39
End: 2024-05-14
Payer: COMMERCIAL

## 2024-05-14 VITALS — BODY MASS INDEX: 34.8 KG/M2 | WEIGHT: 196.44 LBS | SYSTOLIC BLOOD PRESSURE: 133 MMHG | DIASTOLIC BLOOD PRESSURE: 86 MMHG

## 2024-05-14 DIAGNOSIS — Z3A.38 38 WEEKS GESTATION OF PREGNANCY: Primary | ICD-10-CM

## 2024-05-14 PROCEDURE — 99999 PR PBB SHADOW E&M-EST. PATIENT-LVL II: CPT | Mod: PBBFAC,,,

## 2024-05-14 PROCEDURE — 0502F SUBSEQUENT PRENATAL CARE: CPT | Mod: CPTII,S$GLB,,

## 2024-05-14 NOTE — PROGRESS NOTES
38 y.o.,  at 38w1d presents today for routine OB appt.  Denies HA, visual disturbances, or upper abdominal pain/GI complaints. No regular UCs, denies LOF or VB. Reports good FM  Doing well without concerns.    PHYSICAL EXAM  GENERAL: No acute distress  HEENT: Normocephalic, atraumatic  NEURO: Alert and oriented x3  PULMONARY: Non-labored respiration; no tachypnea  ABD: Soft, gravid, nontender.    ASSESSMENT AND PLAN  There are no diagnoses linked to this encounter.  Delivery consents already signed  Discussed plans for support people during labor - patient plans to have Ronald and her mother.  Reviewed warning signs, normal FM, labor precautions, and how/when to call.      Follow-up: 1 week, call or present sooner ASHLEIGH Eldridge CNM, APRN

## 2024-05-20 ENCOUNTER — ROUTINE PRENATAL (OUTPATIENT)
Dept: OBSTETRICS AND GYNECOLOGY | Facility: CLINIC | Age: 39
End: 2024-05-20
Payer: COMMERCIAL

## 2024-05-20 VITALS
SYSTOLIC BLOOD PRESSURE: 131 MMHG | WEIGHT: 198.94 LBS | HEART RATE: 89 BPM | DIASTOLIC BLOOD PRESSURE: 78 MMHG | BODY MASS INDEX: 35.25 KG/M2

## 2024-05-20 DIAGNOSIS — Z3A.39 39 WEEKS GESTATION OF PREGNANCY: Primary | ICD-10-CM

## 2024-05-20 DIAGNOSIS — O09.513 PRIMIGRAVIDA OF ADVANCED MATERNAL AGE IN THIRD TRIMESTER: ICD-10-CM

## 2024-05-20 PROCEDURE — 0502F SUBSEQUENT PRENATAL CARE: CPT | Mod: CPTII,S$GLB,,

## 2024-05-20 PROCEDURE — 99999 PR PBB SHADOW E&M-EST. PATIENT-LVL III: CPT | Mod: PBBFAC,,,

## 2024-05-20 NOTE — PROGRESS NOTES
38 y.o.,  at 39w0d presents today for routine OB appt.  Denies HA, visual disturbances, or upper abdominal pain/GI complaints. No regular UCs, denies LOF or VB. Reports good FM  Doing well with no concerns.    PHYSICAL EXAM  GENERAL: No acute distress  HEENT: Normocephalic, atraumatic  NEURO: Alert and oriented x3  PULMONARY: Non-labored respiration; no tachypnea  ABD: Soft, gravid, nontender.    ASSESSMENT AND PLAN  39 weeks gestation of pregnancy    Primigravida of advanced maternal age in third trimester      Delivery consents were already signed  Discussed plans for support people during labor - patient plans to have Ronald, maybe mom.  Reviewed warning signs, normal FM, labor precautions, and how/when to call.      Follow-up: 1 week, call or present sooner ASHLEIGH Eldridge CNM, APRN

## 2024-05-21 ENCOUNTER — OFFICE VISIT (OUTPATIENT)
Dept: PEDIATRICS | Facility: CLINIC | Age: 39
End: 2024-05-21
Payer: COMMERCIAL

## 2024-05-21 DIAGNOSIS — Z76.81 EXPECTANT PARENT PREBIRTH PEDIATRICIAN VISIT: Primary | ICD-10-CM

## 2024-05-21 PROCEDURE — 99499 UNLISTED E&M SERVICE: CPT | Mod: S$GLB,,, | Performed by: STUDENT IN AN ORGANIZED HEALTH CARE EDUCATION/TRAINING PROGRAM

## 2024-05-21 PROCEDURE — 99999 PR PBB SHADOW E&M-EST. PATIENT-LVL I: CPT | Mod: PBBFAC,,, | Performed by: STUDENT IN AN ORGANIZED HEALTH CARE EDUCATION/TRAINING PROGRAM

## 2024-05-27 ENCOUNTER — ROUTINE PRENATAL (OUTPATIENT)
Dept: OBSTETRICS AND GYNECOLOGY | Facility: CLINIC | Age: 39
End: 2024-05-27
Payer: COMMERCIAL

## 2024-05-27 VITALS
SYSTOLIC BLOOD PRESSURE: 130 MMHG | WEIGHT: 199.19 LBS | DIASTOLIC BLOOD PRESSURE: 83 MMHG | HEART RATE: 93 BPM | BODY MASS INDEX: 35.28 KG/M2

## 2024-05-27 DIAGNOSIS — O09.513 PRIMIGRAVIDA OF ADVANCED MATERNAL AGE IN THIRD TRIMESTER: ICD-10-CM

## 2024-05-27 DIAGNOSIS — O26.00 EXCESSIVE WEIGHT GAIN AFFECTING PREGNANCY: ICD-10-CM

## 2024-05-27 DIAGNOSIS — O44.42 LOW-LYING PLACENTA IN SECOND TRIMESTER: ICD-10-CM

## 2024-05-27 DIAGNOSIS — O36.63X1 FETAL MACROSOMIA DURING PREGNANCY IN THIRD TRIMESTER, FETUS 1 OF MULTIPLE GESTATION: ICD-10-CM

## 2024-05-27 DIAGNOSIS — O26.843 UTERINE SIZE DATE DISCREPANCY PREGNANCY, THIRD TRIMESTER: ICD-10-CM

## 2024-05-27 DIAGNOSIS — Z3A.40 40 WEEKS GESTATION OF PREGNANCY: Primary | ICD-10-CM

## 2024-05-27 PROCEDURE — 0502F SUBSEQUENT PRENATAL CARE: CPT | Mod: CPTII,S$GLB,, | Performed by: ADVANCED PRACTICE MIDWIFE

## 2024-05-27 PROCEDURE — 99999 PR PBB SHADOW E&M-EST. PATIENT-LVL II: CPT | Mod: PBBFAC,,, | Performed by: ADVANCED PRACTICE MIDWIFE

## 2024-05-27 NOTE — PROGRESS NOTES
38 y.o. female  at 40w0d, by Estimated Date of Delivery: 24 here for routine OB appt.  Denies LOF, VB, or regular UCs. Reports good FM. Denies s/s Preeclampsia  Doing well without concerns  TWG 46 lbs    PHYSICAL EXAM  /83   Pulse 93   Wt 90.4 kg (199 lb 3 oz)   LMP  (LMP Unknown)   BMI 35.28 kg/m²   GENERAL: No acute distress  HEENT: Normocephalic, atraumatic  NEURO: Alert and oriented x3  PULMONARY: Non-labored respiration; no tachypnea  ABD: Soft, gravid, nontender.  FH 40 cm  + fhts 130s  SVE loose 1 cm/70/-3, VTX by leopolds/SVE. No blood or fluid on glove.     ASSESSMENT AND PLAN  40 weeks gestation of pregnancy  -     Prenatal Testing - NST/KINGS; Standing  -     US MFM Procedure (Viewpoint); Future; Expected date: 2024    Fetal macrosomia during pregnancy in third trimester, fetus 1 of multiple gestation    Low-lying placenta in second trimester - RESOLVED    Excessive weight gain affecting pregnancy  -     US MFM Procedure (Viewpoint); Future; Expected date: 2024    Primigravida of advanced maternal age in third trimester  -     Prenatal Testing - NST/KINGS; Standing    Delivery consents were signed previously.  Growth US ordered and discussed. Per MFM at 36 weeks, possible evolving macrosomia with AC > 3 weeks ahead. # 7 lb 9 oz. 46 lb weight gain.   Discussed postdates management and IOL - patient prefers to await spontaneous labor if possible / desires IOL week of 41 +.  Discussed postdates prenatal testing and that IOL would be recommended immediately if prenatal testing is not reassuring; patient states understanding and agrees to this.  Scheduled NST/KINGS at 41 weeks, day of PANCHITO 6/3/24.    Reviewed warning signs/labor precautions, normal FM, and how/when to call.      Follow-up: 1 week, call or present sooner VIKTORIYAN    Kimi Ferris CNM/KRISTINE

## 2024-05-28 ENCOUNTER — TELEPHONE (OUTPATIENT)
Dept: OBSTETRICS AND GYNECOLOGY | Facility: OTHER | Age: 39
End: 2024-05-28
Payer: COMMERCIAL

## 2024-05-28 ENCOUNTER — ANESTHESIA (OUTPATIENT)
Dept: OBSTETRICS AND GYNECOLOGY | Facility: OTHER | Age: 39
End: 2024-05-28
Payer: COMMERCIAL

## 2024-05-28 ENCOUNTER — HOSPITAL ENCOUNTER (INPATIENT)
Facility: OTHER | Age: 39
LOS: 3 days | Discharge: HOME | End: 2024-05-31
Attending: OBSTETRICS & GYNECOLOGY | Admitting: STUDENT IN AN ORGANIZED HEALTH CARE EDUCATION/TRAINING PROGRAM
Payer: COMMERCIAL

## 2024-05-28 ENCOUNTER — ANESTHESIA EVENT (OUTPATIENT)
Dept: OBSTETRICS AND GYNECOLOGY | Facility: OTHER | Age: 39
End: 2024-05-28
Payer: COMMERCIAL

## 2024-05-28 ENCOUNTER — TELEPHONE (OUTPATIENT)
Dept: OBSTETRICS AND GYNECOLOGY | Facility: CLINIC | Age: 39
End: 2024-05-28
Payer: COMMERCIAL

## 2024-05-28 DIAGNOSIS — O47.9 UTERINE CONTRACTIONS: ICD-10-CM

## 2024-05-28 DIAGNOSIS — Z3A.40 40 WEEKS GESTATION OF PREGNANCY: ICD-10-CM

## 2024-05-28 DIAGNOSIS — O13.9 GESTATIONAL HYPERTENSION, ANTEPARTUM: ICD-10-CM

## 2024-05-28 LAB
ABO + RH BLD: NORMAL
ALBUMIN SERPL BCP-MCNC: 3 G/DL (ref 3.5–5.2)
ALP SERPL-CCNC: 167 U/L (ref 55–135)
ALT SERPL W/O P-5'-P-CCNC: 12 U/L (ref 10–44)
ANION GAP SERPL CALC-SCNC: 14 MMOL/L (ref 8–16)
AST SERPL-CCNC: 17 U/L (ref 10–40)
BASOPHILS # BLD AUTO: 0.02 K/UL (ref 0–0.2)
BASOPHILS NFR BLD: 0.2 % (ref 0–1.9)
BILIRUB SERPL-MCNC: 0.2 MG/DL (ref 0.1–1)
BLD GP AB SCN CELLS X3 SERPL QL: NORMAL
BUN SERPL-MCNC: 8 MG/DL (ref 6–20)
CALCIUM SERPL-MCNC: 9.8 MG/DL (ref 8.7–10.5)
CHLORIDE SERPL-SCNC: 110 MMOL/L (ref 95–110)
CO2 SERPL-SCNC: 16 MMOL/L (ref 23–29)
CREAT SERPL-MCNC: 0.7 MG/DL (ref 0.5–1.4)
CREAT UR-MCNC: 17.6 MG/DL (ref 15–325)
DIFFERENTIAL METHOD BLD: ABNORMAL
EOSINOPHIL # BLD AUTO: 0 K/UL (ref 0–0.5)
EOSINOPHIL NFR BLD: 0.4 % (ref 0–8)
ERYTHROCYTE [DISTWIDTH] IN BLOOD BY AUTOMATED COUNT: 14.1 % (ref 11.5–14.5)
EST. GFR  (NO RACE VARIABLE): >60 ML/MIN/1.73 M^2
GLUCOSE SERPL-MCNC: 77 MG/DL (ref 70–110)
HCT VFR BLD AUTO: 44.1 % (ref 37–48.5)
HGB BLD-MCNC: 14.8 G/DL (ref 12–16)
IMM GRANULOCYTES # BLD AUTO: 0.12 K/UL (ref 0–0.04)
IMM GRANULOCYTES NFR BLD AUTO: 1.1 % (ref 0–0.5)
LYMPHOCYTES # BLD AUTO: 2.8 K/UL (ref 1–4.8)
LYMPHOCYTES NFR BLD: 25.1 % (ref 18–48)
MCH RBC QN AUTO: 32.1 PG (ref 27–31)
MCHC RBC AUTO-ENTMCNC: 33.6 G/DL (ref 32–36)
MCV RBC AUTO: 96 FL (ref 82–98)
MONOCYTES # BLD AUTO: 0.8 K/UL (ref 0.3–1)
MONOCYTES NFR BLD: 7.4 % (ref 4–15)
NEUTROPHILS # BLD AUTO: 7.3 K/UL (ref 1.8–7.7)
NEUTROPHILS NFR BLD: 65.8 % (ref 38–73)
NRBC BLD-RTO: 0 /100 WBC
PLATELET # BLD AUTO: 198 K/UL (ref 150–450)
PMV BLD AUTO: 11.8 FL (ref 9.2–12.9)
POTASSIUM SERPL-SCNC: 4.3 MMOL/L (ref 3.5–5.1)
PROT SERPL-MCNC: 6.7 G/DL (ref 6–8.4)
PROT UR-MCNC: <7 MG/DL (ref 0–15)
PROT/CREAT UR: NORMAL MG/G{CREAT} (ref 0–0.2)
RBC # BLD AUTO: 4.61 M/UL (ref 4–5.4)
SODIUM SERPL-SCNC: 140 MMOL/L (ref 136–145)
SPECIMEN OUTDATE: NORMAL
WBC # BLD AUTO: 11.07 K/UL (ref 3.9–12.7)

## 2024-05-28 PROCEDURE — 59025 FETAL NON-STRESS TEST: CPT

## 2024-05-28 PROCEDURE — 25000003 PHARM REV CODE 250

## 2024-05-28 PROCEDURE — 99284 EMERGENCY DEPT VISIT MOD MDM: CPT | Mod: 25,,, | Performed by: OBSTETRICS & GYNECOLOGY

## 2024-05-28 PROCEDURE — 51702 INSERT TEMP BLADDER CATH: CPT

## 2024-05-28 PROCEDURE — 76815 OB US LIMITED FETUS(S): CPT | Mod: 26,,, | Performed by: OBSTETRICS & GYNECOLOGY

## 2024-05-28 PROCEDURE — 11000001 HC ACUTE MED/SURG PRIVATE ROOM

## 2024-05-28 PROCEDURE — C1751 CATH, INF, PER/CENT/MIDLINE: HCPCS | Performed by: ANESTHESIOLOGY

## 2024-05-28 PROCEDURE — 99285 EMERGENCY DEPT VISIT HI MDM: CPT | Mod: 25

## 2024-05-28 PROCEDURE — 27200710 HC EPIDURAL INFUSION PUMP SET: Performed by: ANESTHESIOLOGY

## 2024-05-28 PROCEDURE — 80053 COMPREHEN METABOLIC PANEL: CPT

## 2024-05-28 PROCEDURE — 59200 INSERT CERVICAL DILATOR: CPT

## 2024-05-28 PROCEDURE — 59400 OBSTETRICAL CARE: CPT | Mod: AA,,, | Performed by: ANESTHESIOLOGY

## 2024-05-28 PROCEDURE — 86593 SYPHILIS TEST NON-TREP QUANT: CPT

## 2024-05-28 PROCEDURE — 63600175 PHARM REV CODE 636 W HCPCS

## 2024-05-28 PROCEDURE — 4A0HXCZ MEASUREMENT OF PRODUCTS OF CONCEPTION, CARDIAC RATE, EXTERNAL APPROACH: ICD-10-PCS | Performed by: OBSTETRICS & GYNECOLOGY

## 2024-05-28 PROCEDURE — 86850 RBC ANTIBODY SCREEN: CPT

## 2024-05-28 PROCEDURE — 85025 COMPLETE CBC W/AUTO DIFF WBC: CPT

## 2024-05-28 PROCEDURE — 84156 ASSAY OF PROTEIN URINE: CPT

## 2024-05-28 PROCEDURE — 59025 FETAL NON-STRESS TEST: CPT | Mod: 26,59,, | Performed by: OBSTETRICS & GYNECOLOGY

## 2024-05-28 PROCEDURE — 62326 NJX INTERLAMINAR LMBR/SAC: CPT

## 2024-05-28 RX ORDER — LIDOCAINE HYDROCHLORIDE 10 MG/ML
10 INJECTION INFILTRATION; PERINEURAL ONCE AS NEEDED
Status: DISCONTINUED | OUTPATIENT
Start: 2024-05-28 | End: 2024-05-30

## 2024-05-28 RX ORDER — METHYLERGONOVINE MALEATE 0.2 MG/ML
200 INJECTION INTRAVENOUS ONCE AS NEEDED
Status: DISCONTINUED | OUTPATIENT
Start: 2024-05-28 | End: 2024-05-30

## 2024-05-28 RX ORDER — TRANEXAMIC ACID 10 MG/ML
1000 INJECTION, SOLUTION INTRAVENOUS EVERY 30 MIN PRN
Status: DISCONTINUED | OUTPATIENT
Start: 2024-05-28 | End: 2024-05-30

## 2024-05-28 RX ORDER — SIMETHICONE 80 MG
1 TABLET,CHEWABLE ORAL 4 TIMES DAILY PRN
Status: DISCONTINUED | OUTPATIENT
Start: 2024-05-28 | End: 2024-05-30

## 2024-05-28 RX ORDER — DIPHENOXYLATE HYDROCHLORIDE AND ATROPINE SULFATE 2.5; .025 MG/1; MG/1
2 TABLET ORAL EVERY 6 HOURS PRN
Status: DISCONTINUED | OUTPATIENT
Start: 2024-05-28 | End: 2024-05-30

## 2024-05-28 RX ORDER — OXYTOCIN 10 [USP'U]/ML
10 INJECTION, SOLUTION INTRAMUSCULAR; INTRAVENOUS ONCE AS NEEDED
Status: DISCONTINUED | OUTPATIENT
Start: 2024-05-28 | End: 2024-05-30

## 2024-05-28 RX ORDER — SODIUM CHLORIDE, SODIUM LACTATE, POTASSIUM CHLORIDE, CALCIUM CHLORIDE 600; 310; 30; 20 MG/100ML; MG/100ML; MG/100ML; MG/100ML
INJECTION, SOLUTION INTRAVENOUS CONTINUOUS
Status: DISCONTINUED | OUTPATIENT
Start: 2024-05-28 | End: 2024-05-30

## 2024-05-28 RX ORDER — CARBOPROST TROMETHAMINE 250 UG/ML
250 INJECTION, SOLUTION INTRAMUSCULAR
Status: DISCONTINUED | OUTPATIENT
Start: 2024-05-28 | End: 2024-05-30

## 2024-05-28 RX ORDER — ONDANSETRON 8 MG/1
8 TABLET, ORALLY DISINTEGRATING ORAL EVERY 8 HOURS PRN
Status: DISCONTINUED | OUTPATIENT
Start: 2024-05-28 | End: 2024-05-30

## 2024-05-28 RX ORDER — SODIUM CHLORIDE 9 MG/ML
INJECTION, SOLUTION INTRAVENOUS
Status: DISCONTINUED | OUTPATIENT
Start: 2024-05-28 | End: 2024-05-30

## 2024-05-28 RX ORDER — FENTANYL/BUPIVACAINE/NS/PF 2MCG/ML-.1
PLASTIC BAG, INJECTION (ML) INJECTION
Status: DISCONTINUED | OUTPATIENT
Start: 2024-05-28 | End: 2024-05-29

## 2024-05-28 RX ORDER — CALCIUM CARBONATE 200(500)MG
500 TABLET,CHEWABLE ORAL 3 TIMES DAILY PRN
Status: DISCONTINUED | OUTPATIENT
Start: 2024-05-28 | End: 2024-05-30

## 2024-05-28 RX ORDER — OXYTOCIN/RINGER'S LACTATE 30/500 ML
95 PLASTIC BAG, INJECTION (ML) INTRAVENOUS ONCE
Status: DISCONTINUED | OUTPATIENT
Start: 2024-05-28 | End: 2024-05-30

## 2024-05-28 RX ORDER — OXYTOCIN/RINGER'S LACTATE 30/500 ML
95 PLASTIC BAG, INJECTION (ML) INTRAVENOUS ONCE AS NEEDED
Status: DISCONTINUED | OUTPATIENT
Start: 2024-05-28 | End: 2024-05-30

## 2024-05-28 RX ORDER — OXYTOCIN/RINGER'S LACTATE 30/500 ML
0-32 PLASTIC BAG, INJECTION (ML) INTRAVENOUS CONTINUOUS
Status: DISCONTINUED | OUTPATIENT
Start: 2024-05-28 | End: 2024-05-30

## 2024-05-28 RX ORDER — MISOPROSTOL 200 UG/1
800 TABLET ORAL ONCE AS NEEDED
Status: COMPLETED | OUTPATIENT
Start: 2024-05-28 | End: 2024-05-29

## 2024-05-28 RX ORDER — MISOPROSTOL 200 UG/1
800 TABLET ORAL ONCE AS NEEDED
Status: DISCONTINUED | OUTPATIENT
Start: 2024-05-28 | End: 2024-05-30

## 2024-05-28 RX ORDER — FENTANYL/BUPIVACAINE/NS/PF 2MCG/ML-.1
PLASTIC BAG, INJECTION (ML) INJECTION
Status: COMPLETED
Start: 2024-05-28 | End: 2024-05-28

## 2024-05-28 RX ORDER — OXYTOCIN/RINGER'S LACTATE 30/500 ML
334 PLASTIC BAG, INJECTION (ML) INTRAVENOUS ONCE AS NEEDED
Status: DISCONTINUED | OUTPATIENT
Start: 2024-05-28 | End: 2024-05-30

## 2024-05-28 RX ORDER — LIDOCAINE HYDROCHLORIDE AND EPINEPHRINE 15; 5 MG/ML; UG/ML
INJECTION, SOLUTION EPIDURAL
Status: DISCONTINUED | OUTPATIENT
Start: 2024-05-28 | End: 2024-05-29

## 2024-05-28 RX ORDER — OXYTOCIN/RINGER'S LACTATE 30/500 ML
334 PLASTIC BAG, INJECTION (ML) INTRAVENOUS ONCE
Status: COMPLETED | OUTPATIENT
Start: 2024-05-28 | End: 2024-05-29

## 2024-05-28 RX ADMIN — OXYTOCIN 4 MILLI-UNITS/MIN: 10 INJECTION INTRAVENOUS at 10:05

## 2024-05-28 RX ADMIN — SODIUM CHLORIDE, POTASSIUM CHLORIDE, SODIUM LACTATE AND CALCIUM CHLORIDE: 600; 310; 30; 20 INJECTION, SOLUTION INTRAVENOUS at 10:05

## 2024-05-28 RX ADMIN — Medication 8 ML/HR: at 09:05

## 2024-05-28 RX ADMIN — SODIUM CHLORIDE, POTASSIUM CHLORIDE, SODIUM LACTATE AND CALCIUM CHLORIDE 1000 ML: 600; 310; 30; 20 INJECTION, SOLUTION INTRAVENOUS at 09:05

## 2024-05-28 RX ADMIN — LIDOCAINE HYDROCHLORIDE,EPINEPHRINE BITARTRATE 3 ML: 15; .005 INJECTION, SOLUTION EPIDURAL; INFILTRATION; INTRACAUDAL; PERINEURAL at 09:05

## 2024-05-28 RX ADMIN — Medication 4 ML: at 09:05

## 2024-05-28 NOTE — ED PROVIDER NOTES
Encounter Date: 2024       History     Chief Complaint   Patient presents with    Abdominal Pain     Gives h/o cont since 0300, same increasing in frequency and associated with whitish pv liquid drainage since 1400. Admits to FM. Denies bleeding pv     Jessika Hassan is a 38 y.o. female  at 40w1d with Estimated Date of Delivery: 24 based on 8wk US who presents to [facility] accompanied by , Ronald. Expecting a girl!    Reports regular uterine contractions since this morning. They are now 5 minutes apart and increasing in intensity and duration.  strong contractions, active fetal movement, No vaginal bleeding , Yes loss of fluid, reports leaking/trickling since 1400    Last ate a smoothie at 1700, last drank water on the way here.    This pregnancy has been complicated by   Patient Active Problem List:     Spitz nevus     Pregnancy     Low-lying placenta in second trimester - RESOLVED     Excessive weight gain affecting pregnancy     Possible evolving fetal macrosomia during pregnancy in third trimester     Primigravida of advanced maternal age in third trimester       Presentation: Vertex  Estimated Fetal Weight: 8lb 12oz. US at 36wk showed AGA fetus in 79th%, with EFW 3435 with AC 3 weeks ahead.     Birth Center Risk Assessment: 1- CNM management on L&D    CNM management in ABC  CNM management on L&D  Consultation with OB to develop  plan of care  Collaborative CNM/OB management with delivery on L&D   4-   Permanent referral of care to MD          Review of patient's allergies indicates:  No Known Allergies  Past Medical History:   Diagnosis Date    Possible evolving fetal macrosomia during pregnancy in third trimester 2024    Spitz nevus 2016     Past Surgical History:   Procedure Laterality Date    NO PAST SURGERIES       Family History   Problem Relation Name Age of Onset    Cancer Maternal Grandmother          possible cervical cancer    No Known Problems Father      No  Known Problems Mother      Diabetes Neg Hx      Stroke Neg Hx      Breast cancer Neg Hx      Ovarian cancer Neg Hx      Colon cancer Neg Hx       Social History     Tobacco Use    Smoking status: Former     Current packs/day: 0.00     Types: Cigarettes     Quit date: 7/15/2015     Years since quittin.8    Smokeless tobacco: Never   Substance Use Topics    Alcohol use: Not Currently     Alcohol/week: 5.0 standard drinks of alcohol     Types: 5 Glasses of wine per week    Drug use: No     Review of Systems   Constitutional:  Positive for activity change.        Stopping to breathe and vocalize through contractions   Gastrointestinal:  Positive for abdominal pain.        Pain with contractions  Gravid uterus   Genitourinary:  Positive for vaginal discharge. Negative for vaginal bleeding.   All other systems reviewed and are negative.      Physical Exam     Initial Vitals   BP Pulse Resp Temp SpO2   24 1840 24 1840 24 2100 24 2100 24 1840   (!) 136/91 89 16 98.2 °F (36.8 °C) 100 %      MAP       --                Physical Exam    Nursing note and vitals reviewed.  Constitutional: She appears well-developed and well-nourished.   HENT:   Head: Normocephalic and atraumatic.   Eyes: Pupils are equal, round, and reactive to light.   Neck:   Normal range of motion.  Cardiovascular:  Normal rate.           Pulmonary/Chest: No respiratory distress.   Abdominal: Abdomen is soft. There is no abdominal tenderness.   Genitourinary:    Uterus normal.      Vaginal discharge present.      Genitourinary Comments: Gravid uterus  SVE 2/80/-3     Musculoskeletal:         General: Normal range of motion.      Cervical back: Normal range of motion.     Neurological:   DTR +3   Skin: Skin is warm and dry.   Psychiatric: She has a normal mood and affect. Her behavior is normal. Judgment and thought content normal.     OB LABOR EXAM:             Dilatation: 2.   Station: -3.   Effacement: 80%.             ED  Course   Obtain Fetal nonstress test (NST)    Date/Time: 5/28/2024 7:01 PM    Performed by: Eva Barrera CNM  Authorized by: Eva Barrera CNM    Nonstress Test:     Variability:  6-25 BPM    Decelerations:  None    Accelerations:  15 bpm    Acoustic Stimulator: No      Baseline:  130    Uterine Irritability: No      Contractions:  Regular    Contraction Frequency:  3-5  Biophysical Profile:     Nonstress Test Interpretation: reactive      Overall Impression:  Reassuring  Post-procedure:     Patient tolerance:  Patient tolerated the procedure well with no immediate complications    Labs Reviewed - No data to display         Imaging Results    None          Medications   fentanyl 2mcg/mL with BUPivacaine 0.1% in sdoium chloride 0.9% Epidural 2 mcg/mL- 0.1 % Soln (has no administration in time range)   oxytocin (PITOCIN) 10 unit/mL injection (has no administration in time range)   carboprost (HEMABATE) 250 mcg/mL injection (has no administration in time range)   sodium chloride 0.9% flush 10 mL (has no administration in time range)   oxytocin 30 units in 500 mL lactated ringers infusion (non-titrating) (has no administration in time range)   acetaminophen tablet 650 mg (650 mg Oral Given 5/31/24 0521)   lanolin cream (has no administration in time range)   benzocaine-lanolin (DERMOPLAST) topical spray (has no administration in time range)   hydrocortisone 2.5 % rectal cream (has no administration in time range)   ondansetron disintegrating tablet 8 mg (has no administration in time range)   docusate sodium capsule 200 mg (has no administration in time range)   simethicone chewable tablet 80 mg (has no administration in time range)   diphenhydrAMINE capsule 25 mg (has no administration in time range)   diphenhydrAMINE injection 25 mg (has no administration in time range)   prenatal vitamin oral tablet (1 tablet Oral Given 5/30/24 3522)   measles, mumps and rubella vaccine 1,000-12,500 TCID50/0.5 mL injection 0.5 mL  (has no administration in time range)   Tdap (BOOSTRIX) vaccine injection 0.5 mL (has no administration in time range)   oxytocin 30 units in 500 mL lactated ringers infusion (non-titrating) (has no administration in time range)   oxytocin 30 units in 500 mL lactated ringers infusion (non-titrating) (has no administration in time range)   oxytocin injection 10 Units (has no administration in time range)   miSOPROStoL tablet 800 mcg (has no administration in time range)   miSOPROStoL tablet 800 mcg (has no administration in time range)   methylergonovine injection 200 mcg (has no administration in time range)   carboprost injection 250 mcg (has no administration in time range)   tranexamic acid in NaCl,iso-os IVPB 1,000 mg (has no administration in time range)   diphenoxylate-atropine 2.5-0.025 mg per tablet 2 tablet (has no administration in time range)   ibuprofen tablet 600 mg (600 mg Oral Given 24 0521)   lactated ringers infusion ( Intravenous New Bag 24 0640)   oxyCODONE immediate release tablet 5 mg (has no administration in time range)   oxyCODONE immediate release tablet Tab 10 mg (10 mg Oral Given 24)   lactated ringers bolus 1,000 mL (1,000 mLs Intravenous New Bag 24)   oxytocin 30 units in 500 mL lactated ringers infusion (non-titrating) (20 nadja-units/min Intravenous Rate/Dose Change 24)   miSOPROStoL tablet 800 mcg (800 mcg Rectal Given 24)   fentanyl 2mcg/mL with BUPivacaine 0.1% in sdoium chloride 0.9% Epidural 2 mcg/mL- 0.1 % Soln (  Override pull for Anesthesia 24)   diphenhydrAMINE capsule 25 mg (25 mg Oral Given 24 0643)   acetaminophen tablet 1,000 mg (1,000 mg Oral Given 24 1055)   cefTRIAXone (Rocephin) 1 g in dextrose 5 % in water (D5W) 100 mL IVPB (MB+) (0 g Intravenous Stopped 24)   acetaminophen tablet 1,000 mg (1,000 mg Oral Given 24)     Medical Decision Making   at 15am9dwd presents to [facility]  with strong contractions since this afternoon. She noticed some leaking/trickling of clear fluid around 1400. Denies bleeding.   SVE 2/80/-3. Fluid seen in vagina, negative valsalva, negative nitrazine, negative fern.   Initial /91; also had elevated BP at 34 weeks. Patient and  states that they had just gotten big medical bill and repeat was normal. Third BP today in [facility] was 135/90. Denies s/s of pre-e.   Plan to admit for GHTN, patient desires epidural.   Discussed with Dr Gaming.      Amount and/or Complexity of Data Reviewed  Labs: ordered.    Risk  Decision regarding hospitalization.              Attending Attestation:   Physician Attestation Statement for Resident:  As the supervising MD   Physician Attestation Statement: I have personally seen and examined this patient.   I agree with the above history.  -:   As the supervising MD I agree with the above PE.     As the supervising MD I agree with the above treatment, course, plan, and disposition.   -: NST reactive and reassuring, toco with contractions 3-5 minutes.  Exam c/w early labor.  Sonogram confirms cephalic presentation.  Mild range BP and normal Pre-E labs.  Will admit to L&D for IOL for GHTN.  I was personally present during the critical portions of the procedure(s) performed by the resident and was immediately available in the ED to provide services and assistance as needed during the entire procedure.  I have reviewed and agree with the residents interpretation of the following: lab data.  I have reviewed the following: old records at this facility.                ED Course as of 05/31/24 0558   Fri May 31, 2024   0549 BP(!): 143/94 [CD]   0549 BP(!): 136/91 [CD]   0550 Creatinine: 0.7 [CD]   0550 AST: 17 [CD]   0550 ALT: 12 [CD]   0550 Hemoglobin: 14.8 [CD]   0550 Hematocrit: 44.1 [CD]   0550 Platelet Count: 198 [CD]   0550 Prot/Creat Ratio, Urine: Unable to calculate [CD]      ED Course User Index  [CD] Azalia Gaming MD                            Clinical Impression:  Final diagnoses:  [O47.9] Uterine contractions  [O13.9] Gestational hypertension, antepartum (Primary)  [Z3A.40] 40 weeks gestation of pregnancy          ED Disposition Condition    Send to L&D                 Eva Barrera CNM  05/28/24 1956       Eva Barrera CNM  05/28/24 2009       Azalia Gaming MD  05/31/24 0558

## 2024-05-28 NOTE — TELEPHONE ENCOUNTER
Pt states contractions every 13-15 minutes. Advised to come in when contractions were regular at 5 minutes apart. Good fm. Pt desires natural labor.

## 2024-05-28 NOTE — TELEPHONE ENCOUNTER
"Called patient to check on her since she stated she had been having contractions since this morning.   She reports contractions are now about 9 minutes apart, and increasing in intensity. She is moaning and vocalizing through contractions.   Denies bleeding. Endorses active fetal movement.   States she has been having some thin, watery vaginal discharge for "a couple of hours" but denies any large gushes.   Discussed labor precautions, advised to call back and check in in a couple hours through after hours line.   Desires un-medicated labor, desires ABC. Is a candidate for ABC.  "

## 2024-05-29 PROBLEM — Z91.89 AT RISK FOR POSTPARTUM DEPRESSION: Status: ACTIVE | Noted: 2024-05-29

## 2024-05-29 PROBLEM — O41.1230 CHORIOAMNIONITIS IN THIRD TRIMESTER: Status: ACTIVE | Noted: 2024-05-29

## 2024-05-29 LAB
ALLENS TEST: ABNORMAL
DELSYS: ABNORMAL
HCO3 UR-SCNC: 16.6 MMOL/L (ref 12–29)
HCT VFR BLD CALC: 48 %PCV (ref 36–54)
HGB BLD-MCNC: 16 G/DL
PCO2 BLDA: 30.6 MMHG (ref 27–49)
PH SMN: 7.34 [PH] (ref 7.25–7.45)
PO2 BLDA: 34 MMHG (ref 17–41)
POC BE: -9 MMOL/L
POC IONIZED CALCIUM: 1.52 MMOL/L (ref 1.06–1.42)
POC SATURATED O2: 62 %
POC TCO2: 18 MMOL/L (ref 23–27)
POTASSIUM BLD-SCNC: 4.8 MMOL/L (ref 3.5–5.1)
SAMPLE: ABNORMAL
SITE: ABNORMAL
SODIUM BLD-SCNC: 131 MMOL/L (ref 136–145)
TREPONEMA PALLIDUM IGG+IGM AB [PRESENCE] IN SERUM OR PLASMA BY IMMUNOASSAY: NONREACTIVE

## 2024-05-29 PROCEDURE — 63600175 PHARM REV CODE 636 W HCPCS

## 2024-05-29 PROCEDURE — 63600175 PHARM REV CODE 636 W HCPCS: Performed by: STUDENT IN AN ORGANIZED HEALTH CARE EDUCATION/TRAINING PROGRAM

## 2024-05-29 PROCEDURE — 25000003 PHARM REV CODE 250

## 2024-05-29 PROCEDURE — 25000003 PHARM REV CODE 250: Performed by: STUDENT IN AN ORGANIZED HEALTH CARE EDUCATION/TRAINING PROGRAM

## 2024-05-29 PROCEDURE — 59400 OBSTETRICAL CARE: CPT | Mod: GB,,, | Performed by: STUDENT IN AN ORGANIZED HEALTH CARE EDUCATION/TRAINING PROGRAM

## 2024-05-29 PROCEDURE — 99499 UNLISTED E&M SERVICE: CPT | Mod: GB,,,

## 2024-05-29 PROCEDURE — 59200 INSERT CERVICAL DILATOR: CPT | Mod: ,,,

## 2024-05-29 PROCEDURE — 11000001 HC ACUTE MED/SURG PRIVATE ROOM

## 2024-05-29 PROCEDURE — 0KQM0ZZ REPAIR PERINEUM MUSCLE, OPEN APPROACH: ICD-10-PCS

## 2024-05-29 PROCEDURE — 72200006 HC VAGINAL DELIVERY LEVEL III

## 2024-05-29 RX ORDER — FENTANYL/BUPIVACAINE/NS/PF 2MCG/ML-.1
PLASTIC BAG, INJECTION (ML) INJECTION CONTINUOUS
Status: DISCONTINUED | OUTPATIENT
Start: 2024-05-29 | End: 2024-05-30

## 2024-05-29 RX ORDER — FENTANYL/BUPIVACAINE/NS/PF 2MCG/ML-.1
PLASTIC BAG, INJECTION (ML) INJECTION
Status: DISPENSED
Start: 2024-05-29 | End: 2024-05-30

## 2024-05-29 RX ORDER — MISOPROSTOL 200 UG/1
TABLET ORAL
Status: COMPLETED
Start: 2024-05-29 | End: 2024-05-29

## 2024-05-29 RX ORDER — FAMOTIDINE 10 MG/ML
20 INJECTION INTRAVENOUS ONCE
Status: DISCONTINUED | OUTPATIENT
Start: 2024-05-29 | End: 2024-05-30

## 2024-05-29 RX ORDER — ACETAMINOPHEN 500 MG
1000 TABLET ORAL ONCE
Status: COMPLETED | OUTPATIENT
Start: 2024-05-29 | End: 2024-05-29

## 2024-05-29 RX ORDER — SODIUM CITRATE AND CITRIC ACID MONOHYDRATE 334; 500 MG/5ML; MG/5ML
30 SOLUTION ORAL ONCE
Status: DISCONTINUED | OUTPATIENT
Start: 2024-05-29 | End: 2024-05-30

## 2024-05-29 RX ORDER — DIPHENHYDRAMINE HCL 25 MG
25 CAPSULE ORAL ONCE
Status: COMPLETED | OUTPATIENT
Start: 2024-05-29 | End: 2024-05-29

## 2024-05-29 RX ORDER — CARBOPROST TROMETHAMINE 250 UG/ML
INJECTION, SOLUTION INTRAMUSCULAR
Status: COMPLETED
Start: 2024-05-29 | End: 2024-05-29

## 2024-05-29 RX ORDER — OXYTOCIN 10 [USP'U]/ML
INJECTION, SOLUTION INTRAMUSCULAR; INTRAVENOUS
Status: DISPENSED
Start: 2024-05-29 | End: 2024-05-30

## 2024-05-29 RX ORDER — CARBOPROST TROMETHAMINE 250 UG/ML
INJECTION, SOLUTION INTRAMUSCULAR
Status: DISPENSED
Start: 2024-05-29 | End: 2024-05-30

## 2024-05-29 RX ADMIN — SODIUM CHLORIDE, POTASSIUM CHLORIDE, SODIUM LACTATE AND CALCIUM CHLORIDE: 600; 310; 30; 20 INJECTION, SOLUTION INTRAVENOUS at 12:05

## 2024-05-29 RX ADMIN — MISOPROSTOL 800 MCG: 200 TABLET ORAL at 08:05

## 2024-05-29 RX ADMIN — ACETAMINOPHEN 1000 MG: 500 TABLET ORAL at 10:05

## 2024-05-29 RX ADMIN — DIPHENHYDRAMINE HYDROCHLORIDE 25 MG: 25 CAPSULE ORAL at 06:05

## 2024-05-29 RX ADMIN — AMPICILLIN SODIUM 2 G: 2 INJECTION, POWDER, FOR SOLUTION INTRAMUSCULAR; INTRAVENOUS at 06:05

## 2024-05-29 RX ADMIN — ACETAMINOPHEN 1000 MG: 500 TABLET ORAL at 09:05

## 2024-05-29 RX ADMIN — SODIUM CHLORIDE, POTASSIUM CHLORIDE, SODIUM LACTATE AND CALCIUM CHLORIDE: 600; 310; 30; 20 INJECTION, SOLUTION INTRAVENOUS at 08:05

## 2024-05-29 RX ADMIN — Medication 334 MILLI-UNITS/MIN: at 08:05

## 2024-05-29 RX ADMIN — CARBOPROST TROMETHAMINE 250 MCG: 250 INJECTION, SOLUTION INTRAMUSCULAR at 08:05

## 2024-05-29 RX ADMIN — SODIUM CHLORIDE, POTASSIUM CHLORIDE, SODIUM LACTATE AND CALCIUM CHLORIDE: 600; 310; 30; 20 INJECTION, SOLUTION INTRAVENOUS at 05:05

## 2024-05-29 RX ADMIN — CEFTRIAXONE SODIUM 1 G: 1 INJECTION, POWDER, FOR SOLUTION INTRAMUSCULAR; INTRAVENOUS at 09:05

## 2024-05-29 RX ADMIN — AMPICILLIN SODIUM 2 G: 2 INJECTION, POWDER, FOR SOLUTION INTRAMUSCULAR; INTRAVENOUS at 10:05

## 2024-05-29 RX ADMIN — DIPHENOXYLATE HYDROCHLORIDE AND ATROPINE SULFATE 2 TABLET: .025; 2.5 TABLET ORAL at 08:05

## 2024-05-29 RX ADMIN — GENTAMICIN SULFATE 338 MG: 40 INJECTION, SOLUTION INTRAMUSCULAR; INTRAVENOUS at 11:05

## 2024-05-29 NOTE — PLAN OF CARE
24 1231   OB SCREEN   Assessment Type Discharge Planning Brief Assessment   Source of Information health record   Received Prenatal Care Yes   Any indications/suspicions for None   Is this a teen pregnancy No   Is the baby in NICU No   Indication for adoption/Safe Haven No   Indication for DME/post-acute needs No   HIV (+) No   Any congenital  disorders No   Fetal demise/ death No     Patient has been screened for Social Work discharge planning needs. Based on documentation in medical record, no discharge planning needs are anticipated at this time. Should any discharge planning needs arise, please consult .

## 2024-05-29 NOTE — ASSESSMENT & PLAN NOTE
Admit  IV, T&S, Treponem pallidum antibodys, Pre-e labs  Epidural desired upon admit   Plan for CRB and pitocin when epidural is placed  Close maternal/fetal monitoring  Update physician team as needed

## 2024-05-29 NOTE — PROGRESS NOTES
S:  Pt resting comfortably with epidural, no complaints.  Family at bedside and supportive.   Consents to SVE    O: /81   Pulse (!) 111   Temp 98.2 °F (36.8 °C) (Oral)   Resp 16   LMP  (LMP Unknown)   SpO2 99%   Breastfeeding No     GENERAL: Calm and appropriate affect  NEURO: Alert, oriented, normal speech  ABDOMEN: Nontender, Fundus palpates soft between UC's.  FHT: Baseline 135, moderate BTBV, positive accels, no decels. Cat 1, reassuring.  CTX: q 3 minutes  SVE: 4/80/-1, CRB out with gentle traction, small amount of blood on glove      ASSESSMENT:   38 y.o.  IUP at 40w2d, FHT reassuring/ Cat 1    Patient Active Problem List   Diagnosis    Spitz nevus    Pregnancy    Low-lying placenta in second trimester - RESOLVED    Excessive weight gain affecting pregnancy    Possible evolving fetal macrosomia during pregnancy in third trimester    Primigravida of advanced maternal age in third trimester    Gestational hypertension, antepartum         PLAN:  Continue close Maternal/Fetal monitoring  Pitocin Augmentation per protocol - currently infusing at 4mu  Recheck 4 hours or PRN  Epidural in place  Consider AROM with next check if patient amenable  Physician team updated

## 2024-05-29 NOTE — ANESTHESIA PROCEDURE NOTES
CSE    Patient location during procedure: OB   Reason for block: primary anesthetic   Reason for block: labor analgesia requested by patient and obstetrician  Diagnosis: IUP   Start time: 5/28/2024 9:23 PM  Timeout: 5/28/2024 9:20 PM  End time: 5/28/2024 9:30 PM  Surgery related to: vaginal delivery    Staffing  Performing Provider: Evin Cantu MD  Authorizing Provider: Diana Mcgill MD    Staffing  Performed by: Evin Cantu MD  Authorized by: Diana Mcgill MD        Preanesthetic Checklist  Completed: patient identified, IV checked, site marked, risks and benefits discussed, surgical consent, monitors and equipment checked, pre-op evaluation, timeout performed, anesthesia consent given, hand hygiene performed and patient being monitored  Preparation  Patient position: sitting  Prep: ChloraPrep  Patient monitoring: ECG, Pulse Ox and Blood Pressure  Reason for block: primary anesthetic   Epidural  Skin Anesthetic: lidocaine 1%  Skin Wheal: 3 mL  Administration type: continuous  Approach: midline  Interspace: L3-4    Injection technique: MAYA air  Needle and Epidural Catheter  Needle type: Tuohy   Needle gauge: 17  Needle length: 3.5 inches  Needle insertion depth: 5 cm  Catheter type: [employer]  Catheter size: 19 G  Catheter at skin depth: 9 cm  Insertion Attempts: 1  Test dose: 3 mL of lidocaine 1.5% with Epi 1-to-200,000  Additional Documentation: no paresthesia on injection, incremental injection, no significant pain on injection, negative aspiration for heme and CSF, no signs/symptoms of IV or SA injection and no significant complaints from patient  Needle localization: anatomical landmarks  Medications:  Volume per aspiration: 5 mL  Time between aspirations: 5 minutes   Assessment  Ease of block: easy  Patient's tolerance of the procedure: comfortable throughout block and no complaints No inadvertent dural puncture with Tuohy.  Dural puncture performed with spinal needle.

## 2024-05-29 NOTE — PROGRESS NOTES
"LABOR NOTE    S:  Pt resting comfortable with epidural. Does report feeling some "pressure".  Family at bedside and supportive.   Dr Null at bedside. Familiar with patient. Transferred from Dr. Null to our CNM practice during pregnancy as desired low intervention labor/delivery.    O: BP (!) 140/83   Pulse 103   Temp 97.8 °F (36.6 °C)   Resp 16   Ht 5' 3" (1.6 m)   Wt 90.5 kg (199 lb 8.3 oz)   LMP  (LMP Unknown)   SpO2 100%   Breastfeeding No   BMI 35.34 kg/m²     GENERAL: Calm and appropriate affect  NEURO: Alert, oriented, normal speech  ABDOMEN: Nontender, Fundus palpates soft between UC's.  FHT: Baseline 150s, moderate BTBV, positive accels, no decels. Cat 1, reassuring. FSE found to be off with position change.  CTX: q 3-4minutes  SVE: by Dr. Null Ant lip, +1, OA  Pitocin at 4 mu      ASSESSMENT:   38 y.o.  IUP at 40w2d, FHT reassuring/ Cat 1    Patient Active Problem List   Diagnosis    Spitz nevus    Pregnancy    Low-lying placenta in second trimester - RESOLVED    Excessive weight gain affecting pregnancy    Possible evolving fetal macrosomia during pregnancy in third trimester    Primigravida of advanced maternal age in third trimester    Gestational hypertension, antepartum    Chorioamnionitis in third trimester         PLAN:  Continue close Maternal/Fetal monitoring  Pitocin Augmentation per protocol - currently infusing at 5mu.  Continue IV abx per protocol for Chorioamnionitis.   Recheck 1 hour or PRN.  SVE in 30-60 minutes and begin to push if complete. Dr. Null, Staff OB Attending now on duty, updated on status. Agrees with plan.       "

## 2024-05-29 NOTE — HPI
Jessika Hassan is a 38 y.o. female  at 40w1d with Estimated Date of Delivery: 24 based on 8wk US who presents to [facility] accompanied by , Ronald. Expecting a girl!     Reports regular uterine contractions since this morning. They are now 5 minutes apart and increasing in intensity and duration.  strong contractions, active fetal movement, No vaginal bleeding , Yes loss of fluid, reports leaking/trickling since 1400     Last ate a smoothie at 1700, last drank water on the way here.     This pregnancy has been complicated by   Patient Active Problem List:     Spitz nevus     Pregnancy     Low-lying placenta in second trimester - RESOLVED     Excessive weight gain affecting pregnancy     Possible evolving fetal macrosomia during pregnancy in third trimester     Primigravida of advanced maternal age in third trimester        Presentation:Vertex by SVE and US  Estimated Fetal Weight: 8lb 12oz. US at 36wk showed AGA fetus in 79th%, with EFW 3435 with AC 3 weeks ahead.      Birth Center Risk Assessment: 1- CNM management on L&D - [place]     CNM management in ABC  CNM management on L&D  Consultation with OB to develop  plan of care  Collaborative CNM/OB management with delivery on L&D              4-   Permanent referral of care to MD

## 2024-05-29 NOTE — ANESTHESIA PREPROCEDURE EVALUATION
[facility]  Anesthesia Pre-Operative Evaluation         Patient Name: Jessika Hassan  YOB: 1985  MRN: [id number]    2024      Jessika Hassan is a 38 y.o. female  with IUP @ 40w1d who presents with contractions. IUP is complicated by AMA, BMI 35, and gHTN.     Denies previous issues with general anesthesia.    Denies family history of issues with anesthesia.     She otherwise has no noted bleeding/clotting disorder, significant cardiopulmonary disease or spinal pathology.        OB History    Para Term  AB Living   3       2     SAB IAB Ectopic Multiple Live Births     2            # Outcome Date GA Lbr Mitch/2nd Weight Sex Type Anes PTL Lv   3 Current            2 IAB  7w0d    TAB      1 IAB  7w0d    TAB          Review of patient's allergies indicates:  No Known Allergies    Wt Readings from Last 1 Encounters:   24 1529 90.4 kg (199 lb 3 oz)       BP Readings from Last 3 Encounters:   24 (!) 143/94   24 130/83   24 131/78       Patient Active Problem List   Diagnosis    Spitz nevus    Pregnancy    Low-lying placenta in second trimester - RESOLVED    Excessive weight gain affecting pregnancy    Possible evolving fetal macrosomia during pregnancy in third trimester    Primigravida of advanced maternal age in third trimester    Gestational hypertension, antepartum       Past Surgical History:   Procedure Laterality Date    NO PAST SURGERIES         Social History     Socioeconomic History    Marital status:      Spouse name: Rnoald    Highest education level: Bachelor's degree (e.g., BA, AB, BS)   Tobacco Use    Smoking status: Former     Current packs/day: 0.00     Types: Cigarettes     Quit date: 7/15/2015     Years since quittin.8    Smokeless tobacco: Never   Substance and Sexual Activity    Alcohol use: Not Currently     Alcohol/week: 5.0 standard drinks of alcohol     Types: 5 Glasses of wine per  "week    Drug use: No    Sexual activity: Yes     Partners: Male     Birth control/protection: None   Social History Narrative    Patient works in [employer]    Partner: Ronald Yusuf[employer] in [employer]         Chemistry        Component Value Date/Time     05/28/2024 2038    K 4.3 05/28/2024 2038     05/28/2024 2038    CO2 16 (L) 05/28/2024 2038    BUN 8 05/28/2024 2038    CREATININE 0.7 05/28/2024 2038    GLU 77 05/28/2024 2038        Component Value Date/Time    CALCIUM 9.8 05/28/2024 2038    ALKPHOS 167 (H) 05/28/2024 2038    AST 17 05/28/2024 2038    ALT 12 05/28/2024 2038    BILITOT 0.2 05/28/2024 2038            Lab Results   Component Value Date    WBC 11.07 05/28/2024    HGB 14.8 05/28/2024    HCT 44.1 05/28/2024    MCV 96 05/28/2024     05/28/2024       No results for input(s): "PT", "INR", "PROTIME", "APTT" in the last 72 hours.          Pre-op Assessment    I have reviewed the Patient Summary Reports.     I have reviewed the Nursing Notes. I have reviewed the NPO Status.   I have reviewed the Medications.     Review of Systems  Anesthesia Hx:  No problems with previous Anesthesia               Denies Personal Hx of Anesthesia complications.                    Social:  Non-Smoker       Hematology/Oncology:  Hematology Normal   Oncology Normal                                   EENT/Dental:  EENT/Dental Normal           Cardiovascular:     Hypertension                                        Pulmonary:  Pulmonary Normal                       Renal/:  Renal/ Normal                 Hepatic/GI:  Hepatic/GI Normal                 Musculoskeletal:  Musculoskeletal Normal                Neurological:  Neurology Normal                                      Endocrine:  Endocrine Normal            Dermatological:  Skin Normal    Psych:  Psychiatric Normal                    Physical Exam  General: Well nourished, Cooperative and Alert    Airway:  Mallampati: II   Mouth Opening: " Normal  TM Distance: Normal  Tongue: Normal  Neck ROM: Normal ROM    Dental:  Intact        Anesthesia Plan  Type of Anesthesia, risks & benefits discussed:    Anesthesia Type: Gen ETT, Spinal, CSE, Epidural  Intra-op Monitoring Plan: Standard ASA Monitors  Post Op Pain Control Plan: multimodal analgesia and IV/PO Opioids PRN  Induction:  IV  Airway Plan: Video, Post-Induction  Informed Consent: Informed consent signed with the Patient and all parties understand the risks and agree with anesthesia plan.  All questions answered. Patient consented to blood products? Yes  ASA Score: 3  Day of Surgery Review of History & Physical: H&P Update referred to the surgeon/provider.I have interviewed and examined the patient. I have reviewed the patient's H&P dated:     Ready For Surgery From Anesthesia Perspective.     .

## 2024-05-29 NOTE — PROGRESS NOTES
"LABOR NOTE    S:  Pt resting comfortably with epidural, no complaints.  Family at bedside and supportive.     O: /65   Pulse 102   Temp 99.1 °F (37.3 °C)   Resp 16   Ht 5' 3" (1.6 m)   Wt 90.5 kg (199 lb 8.3 oz)   LMP  (LMP Unknown)   SpO2 98%   Breastfeeding No   BMI 35.34 kg/m²     GENERAL: Calm and appropriate affect  NEURO: Alert, oriented, normal speech  ABDOMEN: Nontender, Fundus palpates soft between UC's.  FHT: Baseline 150s, moderate BTBV, positive accels, no decels. Cat 1, reassuring.  CTX: q 3-4.5 minutes  SVE: 9/100/+1 OP  Pitocin at 5 mu      ASSESSMENT:   38 y.o.  IUP at 40w2d, FHT reassuring/ Cat 1    Patient Active Problem List   Diagnosis    Spitz nevus    Pregnancy    Low-lying placenta in second trimester - RESOLVED    Excessive weight gain affecting pregnancy    Possible evolving fetal macrosomia during pregnancy in third trimester    Primigravida of advanced maternal age in third trimester    Gestational hypertension, antepartum    Chorioamnionitis in third trimester         PLAN:  Continue close Maternal/Fetal monitoring  Pitocin Augmentation per protocol - currently infusing at 5mu.  Continue IV abx per protocol for Chorioamnionitis.   Recheck 2-3 hours or PRN  OB Staff Attending, Dr. Garcia and ob resident team updated on status/plan.          "

## 2024-05-29 NOTE — H&P
[facility] Emergency Dept (Obstetrics)  Obstetrics  History & Physical    Patient Name: Jessika Hassan  MRN: [id number]  Admission Date: 2024  Primary Care Provider: No, Primary Doctor    Subjective:     Principal Problem:Gestational hypertension, antepartum    History of Present Illness:  Jessika Hassan is a 38 y.o. female  at 40w1d with Estimated Date of Delivery: 24 based on 8wk US who presents to L&D for IOL accompanied by , Ronald. Expecting a girl!     Reports regular uterine contractions since this morning. They are now 5 minutes apart and increasing in intensity and duration.  strong contractions, active fetal movement, No vaginal bleeding , Yes loss of fluid, reports leaking/trickling since 1400     Last ate a smoothie at 1700, last drank water on the way here.     This pregnancy has been complicated by   Patient Active Problem List:     Spitz nevus     Pregnancy     Low-lying placenta in second trimester - RESOLVED     Excessive weight gain affecting pregnancy     Possible evolving fetal macrosomia during pregnancy in third trimester     Primigravida of advanced maternal age in third trimester        Presentation:Vertex by SVE and US  Estimated Fetal Weight: 8lb 12oz. US at 36wk showed AGA fetus in 79th%, with EFW 3435 with AC 3 weeks ahead.      Birth Center Risk Assessment: 1- CNM management on L&D - GHTN     CNM management in ABC  CNM management on L&D  Consultation with OB to develop  plan of care  Collaborative CNM/OB management with delivery on L&D              4-   Permanent referral of care to MD    No new subjective & objective note has been filed under this hospital service since the last note was generated.    Assessment/Plan:     38 y.o. female  at 40w1d for:    * Gestational hypertension, antepartum  Admit  IV, T&S, Treponem pallidum antibodys, Pre-e labs  Epidural desired upon admit   Plan for CRB and pitocin when epidural is placed  Close  maternal/fetal monitoring  Update physician team as needed     Possible evolving fetal macrosomia during pregnancy in third trimester  79th%ile at 36 weeks. EFW 3435g. AC measuring 3wk ahead        Eva Barrera CNM  Obstetrics  [facility] - Emergency Dept (Obstetrics)

## 2024-05-29 NOTE — PROGRESS NOTES
"[facility] - Labor & Delivery  Obstetrics  Antepartum Progress Note    Patient Name: Jessika Hassan  MRN: [id number]  Admission Date: 2024  Hospital Length of Stay: 1 days  Attending Physician: Winter Reza MD  Primary Care Provider: Genesis, Primary Doctor    Subjective:     Principal Problem:Gestational hypertension, antepartum  Comfortable with epidural in place/spouse at bedside.    BP (!) 108/58   Pulse (!) 112   Temp (!) 100.4 °F (38 °C)   Resp 16   Ht 5' 3" (1.6 m)   Wt 90.5 kg (199 lb 8.3 oz)   LMP  (LMP Unknown)   SpO2 100%   Breastfeeding No   BMI 35.34 kg/m²     Fhts 180s, + accels. Intermittent variable decels. Moderate variability. Contractions q q 3-5 minutes. Maternal temp 100.2. ROM x 8+ hours. Fluid remains clear. SVE 8/90/0, vtx. OP. LR bolus infusing. Pit at 10 mu, turned off. Repositioned to opposite side. FSE placed.  Repeat temp 100.4. Amp/Gent initiated. Fhts decreased to 150s-160s, no decels. Moderate variability. Contractions q 2-5 minutes.    Hospital Course:  1830 - 2/80/-3  2200 - 3/80/-3, cervical ripening balloon placed. Comfortable with epidural. Pitocin starting  0200 - 4/80/-1, CRB out with gentle traction  0600 - 6/80/-1 Pit at 8 mu  1030- 8/90/0 , Fetal tachycardia, Pit off, LR Bolus. Temp 100.2  1045 - FSE placed. Temp 100.4. Tylenol 1 gm, IV abx, Chorio. ROM x 8+ hours    No new subjective & objective note has been filed under this hospital service since the last note was generated.    Assessment/Plan:     38 y.o. female  at 40w2d for:    * Gestational hypertension, antepartum  CBC, CMP wnl  Monitor closely for pre eclampsia    Chorioamnionitis in third trimester  Tylenol 1 gm po  LR bolus  IV Amp/Gent    Possible evolving fetal macrosomia during pregnancy in third trimester  79th%ile at 36 weeks. EFW 3435g. AC measuring 3wk ahead   Monitor labor progress closely      Plan:       1. Continue to monitor closely.       2. Restart Pitocin per protocol.        " 3. SVE in 2-3 hours or prn with change in maternal-fetal status.        4. Plan discussed with patient/spouse, all questions answered.        5. OB staff Attending and resident team updated on status/plan.     Kimi Ferris CNM  Obstetrics  [facility] - Labor & Delivery

## 2024-05-29 NOTE — PROGRESS NOTES
"S:  Pt resting comfortably with epidural, no complaints.  Family at bedside and supportive.   Consents to SVE. Endorses LOF since 0250.     O: /62   Pulse 96   Temp 98 °F (36.7 °C) (Oral)   Resp 16   Ht 5' 3" (1.6 m)   Wt 90.5 kg (199 lb 8.3 oz)   LMP  (LMP Unknown)   SpO2 99%   Breastfeeding No   BMI 35.34 kg/m²     GENERAL: Calm and appropriate affect  NEURO: Alert, oriented, normal speech  ABDOMEN: Nontender, Fundus palpates soft between UC's.  FHT: Baseline 150, moderate BTBV, positive accels, no decels. Cat 1, reassuring.  CTX: q 2-3 minutes  SVE: 5-6/90/0 - SROM at 0250; blood tinged. Cervix swollen on anterior L side      ASSESSMENT:   38 y.o.  IUP at 40w2d, FHT reassuring/ Cat 1    Patient Active Problem List   Diagnosis    Spitz nevus    Pregnancy    Low-lying placenta in second trimester - RESOLVED    Excessive weight gain affecting pregnancy    Possible evolving fetal macrosomia during pregnancy in third trimester    Primigravida of advanced maternal age in third trimester    Gestational hypertension, antepartum         PLAN:  Continue close Maternal/Fetal monitoring  Pitocin Augmentation per protocol - currently infusing at 8mu  Recheck 2-4 hours or PRN  Epidural in place  Benadryl for cervical swelling    "

## 2024-05-29 NOTE — HOSPITAL COURSE
1830 - /-3  2200 - 3/80/-3, cervical ripening balloon placed. Comfortable with epidural. Pitocin starting  0200 - /-1, CRB out with gentle traction  0600 - /-1 Pit at 8 mu  1030- 8/90/0 , Fetal tachycardia, Pit off, LR Bolus. Temp 100.2  1045 - FSE placed. Temp 100.4. Tylenol 1 gm, IV abx, Chorio. ROM x 8+ hours  24 PPD#1 - Doing well. Just returned from NICU. Pumping for baby in NICU.     2024 PPD #2   38 year old  s/p . Second degree, labial and sulcus laceration. QBL 600cc. Postpartum anemia, asymptomatic. VSS  normotensive since birth, with several elevated Bps in labor, none severe range, and afebrile. Denies s/s of pre/e. Pumping currently as baby is in NICU. PO pain meds managing postpartum discomforts. Rh +, RI. Desired method of contraception is vasectomy. UTD on immunizations except COVID booster. Normal involution. Plan D/C today.

## 2024-05-29 NOTE — PROGRESS NOTES
"LABOR NOTE    S:  Pt comfortable with epidural. Some pressure.  Family at bedside and supportive.     O: /89   Pulse (!) 122   Temp 97.8 °F (36.6 °C)   Resp 16   Ht 5' 3" (1.6 m)   Wt 90.5 kg (199 lb 8.3 oz)   LMP  (LMP Unknown)   SpO2 100%   Breastfeeding No   BMI 35.34 kg/m²     GENERAL: Calm and appropriate affect  NEURO: Alert, oriented, normal speech  ABDOMEN: Nontender, Fundus palpates soft between UC's.  FHT: Baseline 180s, moderate BTBV, positive accels, Intermittent variable decel. Cat 2.  CTX: q 3-4minutes  Pitocin at 4 mu  Ant lip, +2/educible.     ASSESSMENT:   38 y.o.  IUP at 40w2d, FHT reassuring/ Cat 1    Patient Active Problem List   Diagnosis    Spitz nevus    Pregnancy    Low-lying placenta in second trimester - RESOLVED    Excessive weight gain affecting pregnancy    Possible evolving fetal macrosomia during pregnancy in third trimester    Primigravida of advanced maternal age in third trimester    Gestational hypertension, antepartum    Chorioamnionitis in third trimester         PLAN:  Consulted with Dr. Null. As lip is reducible, will begin to push and monitor fhts and descent closely.   Pitocin Augmentation per protocol - currently infusing at 5mu.  Continue IV abx per protocol for Chorioamnionitis.     "

## 2024-05-30 PROBLEM — O09.513 PRIMIGRAVIDA OF ADVANCED MATERNAL AGE IN THIRD TRIMESTER: Status: RESOLVED | Noted: 2024-05-27 | Resolved: 2024-05-30

## 2024-05-30 PROBLEM — O36.63X0 FETAL MACROSOMIA DURING PREGNANCY IN THIRD TRIMESTER: Status: RESOLVED | Noted: 2024-05-06 | Resolved: 2024-05-30

## 2024-05-30 LAB
BASOPHILS # BLD AUTO: 0.05 K/UL (ref 0–0.2)
BASOPHILS NFR BLD: 0.2 % (ref 0–1.9)
DIFFERENTIAL METHOD BLD: ABNORMAL
EOSINOPHIL # BLD AUTO: 0 K/UL (ref 0–0.5)
EOSINOPHIL NFR BLD: 0.1 % (ref 0–8)
ERYTHROCYTE [DISTWIDTH] IN BLOOD BY AUTOMATED COUNT: 14.2 % (ref 11.5–14.5)
HCT VFR BLD AUTO: 27.8 % (ref 37–48.5)
HGB BLD-MCNC: 9.3 G/DL (ref 12–16)
IMM GRANULOCYTES # BLD AUTO: 0.19 K/UL (ref 0–0.04)
IMM GRANULOCYTES NFR BLD AUTO: 0.9 % (ref 0–0.5)
LYMPHOCYTES # BLD AUTO: 2.1 K/UL (ref 1–4.8)
LYMPHOCYTES NFR BLD: 9.4 % (ref 18–48)
MCH RBC QN AUTO: 32.1 PG (ref 27–31)
MCHC RBC AUTO-ENTMCNC: 33.5 G/DL (ref 32–36)
MCV RBC AUTO: 96 FL (ref 82–98)
MONOCYTES # BLD AUTO: 1 K/UL (ref 0.3–1)
MONOCYTES NFR BLD: 4.3 % (ref 4–15)
NEUTROPHILS # BLD AUTO: 18.9 K/UL (ref 1.8–7.7)
NEUTROPHILS NFR BLD: 85.1 % (ref 38–73)
NRBC BLD-RTO: 0 /100 WBC
PLATELET # BLD AUTO: 170 K/UL (ref 150–450)
PMV BLD AUTO: 11.1 FL (ref 9.2–12.9)
RBC # BLD AUTO: 2.9 M/UL (ref 4–5.4)
WBC # BLD AUTO: 22.17 K/UL (ref 3.9–12.7)

## 2024-05-30 PROCEDURE — 72100002 HC LABOR CARE, 1ST 8 HOURS

## 2024-05-30 PROCEDURE — 25000003 PHARM REV CODE 250

## 2024-05-30 PROCEDURE — 11000001 HC ACUTE MED/SURG PRIVATE ROOM

## 2024-05-30 PROCEDURE — 63600175 PHARM REV CODE 636 W HCPCS: Performed by: STUDENT IN AN ORGANIZED HEALTH CARE EDUCATION/TRAINING PROGRAM

## 2024-05-30 PROCEDURE — 63600175 PHARM REV CODE 636 W HCPCS

## 2024-05-30 PROCEDURE — 72100003 HC LABOR CARE, EA. ADDL. 8 HRS

## 2024-05-30 PROCEDURE — 36415 COLL VENOUS BLD VENIPUNCTURE: CPT

## 2024-05-30 PROCEDURE — 85025 COMPLETE CBC W/AUTO DIFF WBC: CPT

## 2024-05-30 PROCEDURE — 51701 INSERT BLADDER CATHETER: CPT

## 2024-05-30 PROCEDURE — 63600175 PHARM REV CODE 636 W HCPCS: Performed by: OBSTETRICS & GYNECOLOGY

## 2024-05-30 PROCEDURE — 25000003 PHARM REV CODE 250: Performed by: STUDENT IN AN ORGANIZED HEALTH CARE EDUCATION/TRAINING PROGRAM

## 2024-05-30 PROCEDURE — 25000003 PHARM REV CODE 250: Performed by: OBSTETRICS & GYNECOLOGY

## 2024-05-30 RX ORDER — MISOPROSTOL 200 UG/1
800 TABLET ORAL ONCE AS NEEDED
Status: DISCONTINUED | OUTPATIENT
Start: 2024-05-30 | End: 2024-06-01 | Stop reason: HOSPADM

## 2024-05-30 RX ORDER — DIPHENHYDRAMINE HYDROCHLORIDE 50 MG/ML
25 INJECTION INTRAMUSCULAR; INTRAVENOUS EVERY 4 HOURS PRN
Status: DISCONTINUED | OUTPATIENT
Start: 2024-05-30 | End: 2024-06-01 | Stop reason: HOSPADM

## 2024-05-30 RX ORDER — DIPHENHYDRAMINE HCL 25 MG
25 CAPSULE ORAL EVERY 4 HOURS PRN
Status: DISCONTINUED | OUTPATIENT
Start: 2024-05-30 | End: 2024-06-01 | Stop reason: HOSPADM

## 2024-05-30 RX ORDER — DIPHENOXYLATE HYDROCHLORIDE AND ATROPINE SULFATE 2.5; .025 MG/1; MG/1
2 TABLET ORAL EVERY 6 HOURS PRN
Status: DISCONTINUED | OUTPATIENT
Start: 2024-05-30 | End: 2024-06-01 | Stop reason: HOSPADM

## 2024-05-30 RX ORDER — HYDROCORTISONE 25 MG/G
CREAM TOPICAL 3 TIMES DAILY PRN
Status: DISCONTINUED | OUTPATIENT
Start: 2024-05-30 | End: 2024-06-01 | Stop reason: HOSPADM

## 2024-05-30 RX ORDER — OXYCODONE HYDROCHLORIDE 5 MG/1
5 TABLET ORAL EVERY 4 HOURS PRN
Status: DISCONTINUED | OUTPATIENT
Start: 2024-05-30 | End: 2024-06-01 | Stop reason: HOSPADM

## 2024-05-30 RX ORDER — IBUPROFEN 600 MG/1
600 TABLET ORAL EVERY 6 HOURS
Status: DISCONTINUED | OUTPATIENT
Start: 2024-05-30 | End: 2024-06-01 | Stop reason: HOSPADM

## 2024-05-30 RX ORDER — PRENATAL WITH FERROUS FUM AND FOLIC ACID 3080; 920; 120; 400; 22; 1.84; 3; 20; 10; 1; 12; 200; 27; 25; 2 [IU]/1; [IU]/1; MG/1; [IU]/1; MG/1; MG/1; MG/1; MG/1; MG/1; MG/1; UG/1; MG/1; MG/1; MG/1; MG/1
1 TABLET ORAL DAILY
Status: DISCONTINUED | OUTPATIENT
Start: 2024-05-30 | End: 2024-06-01 | Stop reason: HOSPADM

## 2024-05-30 RX ORDER — HYDROCODONE BITARTRATE AND ACETAMINOPHEN 10; 325 MG/1; MG/1
1 TABLET ORAL EVERY 4 HOURS PRN
Status: DISCONTINUED | OUTPATIENT
Start: 2024-05-30 | End: 2024-05-30

## 2024-05-30 RX ORDER — SODIUM CHLORIDE 0.9 % (FLUSH) 0.9 %
10 SYRINGE (ML) INJECTION
Status: DISCONTINUED | OUTPATIENT
Start: 2024-05-30 | End: 2024-06-01 | Stop reason: HOSPADM

## 2024-05-30 RX ORDER — ACETAMINOPHEN 325 MG/1
650 TABLET ORAL EVERY 6 HOURS SCHEDULED
Status: DISCONTINUED | OUTPATIENT
Start: 2024-05-30 | End: 2024-06-01 | Stop reason: HOSPADM

## 2024-05-30 RX ORDER — HYDROCODONE BITARTRATE AND ACETAMINOPHEN 5; 325 MG/1; MG/1
1 TABLET ORAL EVERY 4 HOURS PRN
Status: DISCONTINUED | OUTPATIENT
Start: 2024-05-30 | End: 2024-05-30

## 2024-05-30 RX ORDER — OXYTOCIN/RINGER'S LACTATE 30/500 ML
334 PLASTIC BAG, INJECTION (ML) INTRAVENOUS ONCE AS NEEDED
Status: DISCONTINUED | OUTPATIENT
Start: 2024-05-30 | End: 2024-06-01 | Stop reason: HOSPADM

## 2024-05-30 RX ORDER — METHYLERGONOVINE MALEATE 0.2 MG/ML
200 INJECTION INTRAVENOUS ONCE AS NEEDED
Status: DISCONTINUED | OUTPATIENT
Start: 2024-05-30 | End: 2024-06-01 | Stop reason: HOSPADM

## 2024-05-30 RX ORDER — SIMETHICONE 80 MG
1 TABLET,CHEWABLE ORAL EVERY 6 HOURS PRN
Status: DISCONTINUED | OUTPATIENT
Start: 2024-05-30 | End: 2024-06-01 | Stop reason: HOSPADM

## 2024-05-30 RX ORDER — SODIUM CHLORIDE, SODIUM LACTATE, POTASSIUM CHLORIDE, CALCIUM CHLORIDE 600; 310; 30; 20 MG/100ML; MG/100ML; MG/100ML; MG/100ML
INJECTION, SOLUTION INTRAVENOUS
Status: DISCONTINUED | OUTPATIENT
Start: 2024-05-30 | End: 2024-06-01 | Stop reason: HOSPADM

## 2024-05-30 RX ORDER — ONDANSETRON 8 MG/1
8 TABLET, ORALLY DISINTEGRATING ORAL EVERY 8 HOURS PRN
Status: DISCONTINUED | OUTPATIENT
Start: 2024-05-30 | End: 2024-06-01 | Stop reason: HOSPADM

## 2024-05-30 RX ORDER — DOCUSATE SODIUM 100 MG/1
200 CAPSULE, LIQUID FILLED ORAL 2 TIMES DAILY PRN
Status: DISCONTINUED | OUTPATIENT
Start: 2024-05-30 | End: 2024-06-01 | Stop reason: HOSPADM

## 2024-05-30 RX ORDER — OXYTOCIN/RINGER'S LACTATE 30/500 ML
95 PLASTIC BAG, INJECTION (ML) INTRAVENOUS ONCE
Status: DISCONTINUED | OUTPATIENT
Start: 2024-05-30 | End: 2024-06-01 | Stop reason: HOSPADM

## 2024-05-30 RX ORDER — OXYTOCIN/RINGER'S LACTATE 30/500 ML
95 PLASTIC BAG, INJECTION (ML) INTRAVENOUS ONCE AS NEEDED
Status: DISCONTINUED | OUTPATIENT
Start: 2024-05-30 | End: 2024-06-01 | Stop reason: HOSPADM

## 2024-05-30 RX ORDER — TRANEXAMIC ACID 10 MG/ML
1000 INJECTION, SOLUTION INTRAVENOUS EVERY 30 MIN PRN
Status: DISCONTINUED | OUTPATIENT
Start: 2024-05-30 | End: 2024-06-01 | Stop reason: HOSPADM

## 2024-05-30 RX ORDER — OXYTOCIN 10 [USP'U]/ML
10 INJECTION, SOLUTION INTRAMUSCULAR; INTRAVENOUS ONCE AS NEEDED
Status: DISCONTINUED | OUTPATIENT
Start: 2024-05-30 | End: 2024-06-01 | Stop reason: HOSPADM

## 2024-05-30 RX ORDER — OXYCODONE HYDROCHLORIDE 10 MG/1
10 TABLET ORAL EVERY 4 HOURS PRN
Status: DISCONTINUED | OUTPATIENT
Start: 2024-05-30 | End: 2024-06-01 | Stop reason: HOSPADM

## 2024-05-30 RX ORDER — OXYCODONE HYDROCHLORIDE 5 MG/1
5 TABLET ORAL EVERY 4 HOURS PRN
Status: DISCONTINUED | OUTPATIENT
Start: 2024-05-30 | End: 2024-05-30

## 2024-05-30 RX ORDER — CARBOPROST TROMETHAMINE 250 UG/ML
250 INJECTION, SOLUTION INTRAMUSCULAR
Status: DISCONTINUED | OUTPATIENT
Start: 2024-05-30 | End: 2024-06-01 | Stop reason: HOSPADM

## 2024-05-30 RX ADMIN — SODIUM CHLORIDE, POTASSIUM CHLORIDE, SODIUM LACTATE AND CALCIUM CHLORIDE: 600; 310; 30; 20 INJECTION, SOLUTION INTRAVENOUS at 06:05

## 2024-05-30 RX ADMIN — OXYCODONE HYDROCHLORIDE 10 MG: 10 TABLET ORAL at 05:05

## 2024-05-30 RX ADMIN — ACETAMINOPHEN 650 MG: 325 TABLET, FILM COATED ORAL at 07:05

## 2024-05-30 RX ADMIN — PRENATAL VIT W/ FE FUMARATE-FA TAB 27-0.8 MG 1 TABLET: 27-0.8 TAB at 07:05

## 2024-05-30 RX ADMIN — IBUPROFEN 600 MG: 600 TABLET, FILM COATED ORAL at 05:05

## 2024-05-30 RX ADMIN — IBUPROFEN 600 MG: 600 TABLET, FILM COATED ORAL at 06:05

## 2024-05-30 RX ADMIN — OXYCODONE HYDROCHLORIDE 5 MG: 5 TABLET ORAL at 10:05

## 2024-05-30 RX ADMIN — ACETAMINOPHEN 650 MG: 325 TABLET, FILM COATED ORAL at 01:05

## 2024-05-30 RX ADMIN — ACETAMINOPHEN 650 MG: 325 TABLET, FILM COATED ORAL at 06:05

## 2024-05-30 RX ADMIN — IBUPROFEN 600 MG: 600 TABLET, FILM COATED ORAL at 12:05

## 2024-05-30 RX ADMIN — ACETAMINOPHEN 650 MG: 325 TABLET, FILM COATED ORAL at 12:05

## 2024-05-30 RX ADMIN — AMPICILLIN SODIUM 2 G: 2 INJECTION, POWDER, FOR SOLUTION INTRAMUSCULAR; INTRAVENOUS at 06:05

## 2024-05-30 RX ADMIN — AMPICILLIN SODIUM 2 G: 2 INJECTION, POWDER, FOR SOLUTION INTRAMUSCULAR; INTRAVENOUS at 12:05

## 2024-05-30 RX ADMIN — IBUPROFEN 600 MG: 600 TABLET, FILM COATED ORAL at 01:05

## 2024-05-30 NOTE — LACTATION NOTE
"This note was copied from a baby's chart.  LC assisted with with breastfeeding session. Luis awake. Positioned to left breast in cross cradle hold. Luis appeared interested with mouthing motions but did not latch or suckle. Encouraged continued "lick and learn practice. Infant offered supplement of donor breast milk. Baby nippled 8 ml with improved suck and swallow. Infant still exhibited tongue thrusting. Praise and ongoing lactation support offered,    Beena Welch, BSN, RNC, IBCLC    "

## 2024-05-30 NOTE — LACTATION NOTE
[employer] pump, tubing, collections containers and labels brought to bedside.  Discussed proper pump setting of initiation phase.  Instructed on proper usage of pump and to adjust suction according to maximum comfort level.  Verified appropriate flange fit.  Educated on the frequency and duration of pumping in order to promote and maintain a full milk supply.  Hands on pumping technique reviewed.  Encouraged hand expression after pumping.  Instructed on cleaning of breast pump parts.  Written instructions also given.  Pt verbalized understanding and appropriate recall for proper milk handling, collection, labeling, storage and transportation.

## 2024-05-30 NOTE — PLAN OF CARE
VSS. Scheduled IV antibiotics continued. Pain well controlled with oral pain medication. Pt has not ambulated after delivery due to residual numbness from epidural. Unable to void, CNM ordered for second in and out catheter to be performed. Pt is due to void. Fundus midline, firm, with light to moderate lochia. Patient safety maintained, side rails up, bed low and locked position. Significant other at bedside. Baby in NICU; Pt going down to see baby via wheelchair. She is pumping and bringing colostrum to NICU. Patient safety maintained, side rails up, bed low and locked position. Plan of care reviewed throughout shift. Questions encouraged and answered.

## 2024-05-30 NOTE — ASSESSMENT & PLAN NOTE
Admit  IV, T&S, Treponem pallidum antibodys, Pre-e labs  Epidural desired upon admit   Plan for CRB and pitocin when epidural is placed  Close maternal/fetal monitoring  Update physician team as needed   Plan BP check within one after discharge

## 2024-05-30 NOTE — LACTATION NOTE
Attempted to visit patient in her room, patient visiting baby in NICU.  Patient seen by, and assisted to breastfeed, by the NICU consultant.  Provided NICU Breastfeeding Guide to father, contact phone number provided.  Requested patient to call for assistance prn.

## 2024-05-30 NOTE — PROGRESS NOTES
[facility] ([place])  Obstetrics  Postpartum Progress Note    Patient Name: Jessika Hassan  MRN: [id number]  Admission Date: 2024  Hospital Length of Stay: 2 days  Attending Physician: Winter Reza MD  Primary Care Provider: Genesis, Primary Doctor    Subjective:     Principal Problem: (spontaneous vaginal delivery)    Hospital Course:  1830 - /-3  2200 - 3/80/-3, cervical ripening balloon placed. Comfortable with epidural. Pitocin starting  0200 - -1, CRB out with gentle traction  0600 - /-1 Pit at 8 mu  1030- 8/90/0 , Fetal tachycardia, Pit off, LR Bolus. Temp 100.2  1045 - FSE placed. Temp 100.4. Tylenol 1 gm, IV abx, Chorio. ROM x 8+ hours  24 PPD#1 - Doing well. Just returned from NICU. Pumping for baby in NICU.     Interval History: Doing well, ambulating, voiding, and tolerating regular diet  Denies dizziness or light headed sensation. Denies SOB or difficulty breathing.   Denies headaches, visual disturbances or upper GI pain, nausea, or vomiting.  Reports passing flatus.   Lochia: steadily decreasing  Pain: moderately well controlled requiring PO medication  Breasts/nipples: Pumping for baby in NICU without difficulty; seeing lactation  : Female in NICU      Objective:     Vital Signs (Most Recent):  Temp: 98.3 °F (36.8 °C) (24 0815)  Pulse: 78 (24 0815)  Resp: 18 (24 1014)  BP: (!) 102/58 (24 0815)  SpO2: 97 % (24 0815) Vital Signs (24h Range):  Temp:  [97.8 °F (36.6 °C)-103.5 °F (39.7 °C)] 98.3 °F (36.8 °C)  Pulse:  [] 78  Resp:  [16-18] 18  SpO2:  [88 %-100 %] 97 %  BP: ()/(56-92) 102/58     Weight: 90.5 kg (199 lb 8.3 oz)  Body mass index is 35.34 kg/m².      Intake/Output Summary (Last 24 hours) at 2024 1056  Last data filed at 2024 0645  Gross per 24 hour   Intake 3928.24 ml   Output 3275 ml   Net 653.24 ml         Significant Labs:  Lab Results   Component Value Date    [facility] A POS 2024     HEPBSAG Non-reactive 2023    STREPBCULT No Group B Streptococcus isolated 2024     CBC:   Recent Labs   Lab 24  0659   WBC 22.17*   RBC 2.90*   HGB 9.3*   HCT 27.8*      MCV 96   MCH 32.1*   MCHC 33.5     I have personallly reviewed all pertinent lab results from the last 24 hours.    Physical Exam    Gen: A&O x 4, NAD  CV: normal HR  Lungs: normal resp effort  Breasts: bilaterally soft, non-tender, nipples intact bilaterally  Abdomen: soft, non-tender, uterus firm at U - 1 fb  Perineum: approximated, edema noted  Lochia: minimal rubra  Ext: bilaterally with trace pedal edema, without signs of DVT    Assessment/Plan:     38 y.o. female  for:    *  (spontaneous vaginal delivery)  PPD#1 - Normal uterine involution  Continue routine postpartum care and advances    Postpartum anemia  Asymptomatic  Reviewed warning s/s    At risk for postpartum depression  Plan 2-week mood check after discharge    Shoulder dystocia during labor and delivery   in NICU  Plan 2-week mood check     Chorioamnionitis in third trimester  IV ABX x 24 hours  Afebrile post-delivery    Gestational hypertension, antepartum  Admit  IV, T&S, Treponem pallidum antibodys, Pre-e labs  Epidural desired upon admit   Plan for CRB and pitocin when epidural is placed  Close maternal/fetal monitoring  Update physician team as needed   Plan BP check within one after discharge        Disposition: As patient meets milestones, will plan to discharge home tomorrow.    Su King CNM  Obstetrics  [facility] - Mother & Baby ([place])

## 2024-05-30 NOTE — SUBJECTIVE & OBJECTIVE
Interval History: Doing well, ambulating, voiding, and tolerating regular diet  Denies dizziness or light headed sensation. Denies SOB or difficulty breathing.   Denies headaches, visual disturbances or upper GI pain, nausea, or vomiting.  Reports passing flatus.   Lochia: steadily decreasing  Pain: moderately well controlled requiring PO medication  Breasts/nipples: Pumping for baby in NICU without difficulty; seeing lactation  [place]: Female in NICU      Objective:     Vital Signs (Most Recent):  Temp: 98.3 °F (36.8 °C) (24 0815)  Pulse: 78 (24 0815)  Resp: 18 (24 1014)  BP: (!) 102/58 (24 0815)  SpO2: 97 % (24 0815) Vital Signs (24h Range):  Temp:  [97.8 °F (36.6 °C)-103.5 °F (39.7 °C)] 98.3 °F (36.8 °C)  Pulse:  [] 78  Resp:  [16-18] 18  SpO2:  [88 %-100 %] 97 %  BP: ()/(56-92) 102/58     Weight: 90.5 kg (199 lb 8.3 oz)  Body mass index is 35.34 kg/m².      Intake/Output Summary (Last 24 hours) at 2024 1056  Last data filed at 2024 0645  Gross per 24 hour   Intake 3928.24 ml   Output 3275 ml   Net 653.24 ml         Significant Labs:  Lab Results   Component Value Date    GROUPTRH A POS 2024    HEPBSAG Non-reactive 2023    STREPBCULT No Group B Streptococcus isolated 2024     CBC:   Recent Labs   Lab 24  0659   WBC 22.17*   RBC 2.90*   HGB 9.3*   HCT 27.8*      MCV 96   MCH 32.1*   MCHC 33.5     I have personallly reviewed all pertinent lab results from the last 24 hours.    Physical Exam    Gen: A&O x 4, NAD  CV: normal HR  Lungs: normal resp effort  Breasts: bilaterally soft, non-tender, nipples intact bilaterally  Abdomen: soft, non-tender, uterus firm at U - 1 fb  Perineum: approximated, edema noted  Lochia: minimal rubra  Ext: bilaterally with trace pedal edema, without signs of DVT

## 2024-05-30 NOTE — L&D DELIVERY NOTE
[facility] - Labor & Delivery  Vaginal Delivery   Operative Note    SUMMARY   After 2 hours and 6 minutes of excellent maternal expulsive effort, Normal spontaneous vaginal delivery of live infant, skin to skin was unable to be performed due to shoulder dystocia and non-vigorous  .  Infant delivered position ERICK over perineum.  Nuchal cord: No.    This delivery was complicated by a shoulder dystocia    After the head delivered, the right shoulder was noted to be anterior and held behind the pubic symphysis.  Immediately at the identification of dystocia, I asked the nurse to note the time and call assistance to the room including additional residents, nursing staff and pediatrics. Dr. Null, Dr. Lucas, and Dr. Alegria joined at bedside.   There was enough room for manuevers and an episiotomy was not required.  Maneuvers Required to relieve the dystocia included:     Viktoria, suprapubic pressure, [place], and delivery of the posterior arm  Cord gases were sent  Pediatricians/ NICU were in room at delivery  APGARS were  3 at 1 minute and 7 at 5 minutes  Birthweight 4020g   was moving both arms without evidence of nerve injury at the time of delivery  There was not evidence of clavicular fracture  The Shoulder dystocia lasted a total of 1 minute, 40 seconds    Spontaneous delivery of placenta and IV pitocin given noting uterine atony with bleeding. Hemabate and cytotec ID given.  2nd degree laceration noted and repaired in normal fashion with 2-0 vicryl; right sulcal repaired by Dr. Alegria with 2-0 vicryl- left labial repaired by Julien MALONE with 2-0 vicryl . Sphincter assessed by Dr. Null to be intact.  Patient tolerated delivery well. Sponge needle and lap counted correctly x2.        Indications:  (spontaneous vaginal delivery)  Pregnancy complicated by:   Patient Active Problem List   Diagnosis    Spitz nevus    Pregnancy    Low-lying placenta in second trimester - RESOLVED    Excessive  weight gain affecting pregnancy    Possible evolving fetal macrosomia during pregnancy in third trimester    Primigravida of advanced maternal age in third trimester    Gestational hypertension, antepartum    Chorioamnionitis in third trimester     (spontaneous vaginal delivery)     Admitting GA: 40w2d    Delivery Information for Randy Hassan    Birth information:  YOB: 2022   Time of birth: 8:27 PM   Sex: female   Head Delivery Date/Time: 2024  8:26 PM   Delivery type: Vaginal, Spontaneous   Gestational Age: 40w2d        Delivery Providers    Delivering clinician: Amina Victoria CNM   Provider Role    Rosalinda Null MD Sobba, Bethany, MD Phiyo, Theresa, Georgiana Arroyo, Rebecca Mcconnell ST [place], Jennifer, RN               Measurements    Weight: 4020 g  Weight (lbs): 8 lb 13.8 oz  Length:          Apgars    Living status: Living  Apgar Component Scores:  1 min.:  5 min.:  10 min.:  15 min.:  20 min.:    Skin color:         Heart rate:         Reflex irritability:         Muscle tone:         Respiratory effort:         Total:         Apgars assigned by: NICU         Operative Delivery    Forceps attempted?: No  Vacuum extractor attempted?: No         Shoulder Dystocia    Shoulder dystocia present?: Yes  Anterior shoulder: left  Time recognized: 2024 20:26:31  Time help called: 2024 20:26:50   Help called by: CHANDLER Victoria CNM     Physician/Provider arrived: 2024 20:27:00   Physician/Provider: Tennille SABA MD     NICU arrived: 2024 20:28:30     Gentle attempt at traction, assisted by maternal expulsive forces?: Yes   First maneuver: Viktoria maneuver  First maneuver performed at: 2024 20:26:32  First maneuver performed by: Julien MALONE  Second maneuver: suprapubic pressure  Second maneuver performed at: 2024 20:26:40  Second maneuver performed by: Ally ERVIN  Third maneuver: Woods screw maneuver  Third maneuver performed at:  2024 20:27:10  Third maneuver performed by: Julien MALONE  Fourth maneuver: delivery of posterior arm  Fourth maneuver performed at: 2024 20:27:30  Fourth maneuver performed by: MD Tennille           Presentation    Presentation: Vertex  Position: Left Occiput Anterior           Interventions/Resuscitation    Method: NICU Attended       Cord    Vessels: 3 vessels  Complications: None  Delayed Cord Clamping?: No  Cord Clamped Date/Time: 2024  8:28 PM  Cord Blood Disposition: Sent with Baby  Gases Sent?: Yes  Stem Cell Collection (by MD): No       Placenta    Placenta delivery date/time: 2024  Placenta removal: Expressed  Placenta appearance: Intact  Placenta disposition: Discarded           Labor Events:       labor: No     Labor Onset Date/Time:         Dilation Complete Date/Time: 2024 18:18     Start Pushing Date/Time: 2024 18:18       Start Pushing Date/Time: 2024 18:18     Rupture Date/Time: 24  0250         Rupture type: SRM (Spontaneous Rupture)         Fluid Amount:       Fluid Color: Clear               steroids: None     Antibiotics given for GBS: No     Induction: balloon dilation ([place])     Indications for induction:  Hypertension     Augmentation: oxytocin     Indications for augmentation: Ineffective Contraction Pattern     Labor complications: Shoulder Dystocia;Intraamniotic Infection     Additional complications:          Cervical ripening:                     Delivery:      Episiotomy: None     Indication for Episiotomy:       Perineal Lacerations: 2nd Repaired:  Yes   Periurethral Laceration:   Repaired:     Labial Laceration: left Repaired: Yes   Sulcus Laceration: right Repaired: Yes   Vaginal Laceration:   Repaired:     Cervical Laceration:   Repaired:     Repair suture:       Repair # of packets: 5     Last Value - EBL - Nursing (mL):       Sum - EBL - Nursing (mL): 0     Last Value - EBL - Anesthesia (mL):      Calculated QBL (mL): 600       Running total QBL (mL): 600      Vaginal Sweep Performed: Yes     Surgicount Correct: Yes     Vaginal Packing: No Quantity:       Other providers:       Anesthesia    Method: Epidural          Details (if applicable):  Trial of Labor      Categorization:      Priority:     Indications for :     Incision Type:       Additional  information:  Forceps:    Vacuum:    Breech:    Observed anomalies    Other (Comments):

## 2024-05-30 NOTE — PLAN OF CARE
Copied from baby's chart    Expand All Collapse All    SOCIAL WORK DISCHARGE PLANNING ASSESSMENT     SW completed discharge planning assessment with pt's parents in mother's room 084 .  Pt's parents were easily engaged. Education on the role of  was provided. Emotional support provided throughout assessment.        Legal Name: Lori Newton         :  2022  Address: [address] [place] [address]  Parent's Phone Numbers: Jessika [phone]   Ronald [phone]     Pediatrician:  Abigail Reyes     Education: Information given on NICU Education Classes; Physician/NNP daily rounds; and Postpartum Depression signs.   Potential Eligibility for SSI Benefits: No        Problem List       Patient Active Problem List   Diagnosis    Term  delivered vaginally, current hospitalization    Alteration in nutrition    History of vascular access device    Metabolic acidosis in     Healthcare maintenance    Need for observation and evaluation of  for sepsis               Birth Hospital:[facility]           SABINA: 24     Birth Weight:   4.02 kg (8 lb 13.8 oz)              Birth Length: 54 cm                      Gestational Age: 40w2d           Apgars    Living status: Living  Apgar Component Scores:  1 min.:  5 min.:  10 min.:  15 min.:  20 min.:    Skin color:  0  1          Heart rate:  2  2          Reflex irritability:  0  1          Muscle tone:  0  1          Respiratory effort:  1  2          Total:  3  7          Apgars assigned by: NICU              24 1136   NICU Assessment   Assessment Type Discharge Planning Assessment   Source of Information family   Verified Demographic and Insurance Information Yes   Insurance Commercial    Contact Status none needed   Lives With mother;father   Number people in home 3 including pt   Relationship Status of Parents    Primary Source of Support/Comfort parent   Mother Employed Full Time    Father's Involvement Fully Involved   Is Father signing the birth certificate Yes   Father Name and  Ronald Newton   89   Father's Employer employed   Family Involvement High   Other Contacts Names and Numbers Nuria [place] [phone]   Infant Feeding Plan breastfeeding;expressed breast milk   Previous Breastfeeding Experience no   Does baby have crib or safe sleep space? Yes   Do you have a car seat? Yes   Resource/Environmental Concerns none   Environment Concerns none   Resources/Education Provided [facility];Support Resources for NICU Families;My NICU Baby Alexandre;Post Partum Depression;Preparing for Your Baby's Discharge Home   DME Needed Upon Discharge  none   DCFS No indications (Indicators for Report)   Discharge Plan A Home with family

## 2024-05-30 NOTE — PROGRESS NOTES
Temperature after delivery 103.5 (2035). Rocephin x1 ordered by Dr. Null. Persistently tachycardic to 130-150's, IV fluid bolus administered with continuous IV fluids after bolus. After tylenol administration, temp 99.6.

## 2024-05-31 ENCOUNTER — PATIENT MESSAGE (OUTPATIENT)
Dept: OBSTETRICS AND GYNECOLOGY | Facility: CLINIC | Age: 39
End: 2024-05-31
Payer: COMMERCIAL

## 2024-05-31 VITALS
OXYGEN SATURATION: 96 % | BODY MASS INDEX: 35.35 KG/M2 | HEART RATE: 85 BPM | WEIGHT: 199.5 LBS | TEMPERATURE: 98 F | RESPIRATION RATE: 17 BRPM | SYSTOLIC BLOOD PRESSURE: 114 MMHG | DIASTOLIC BLOOD PRESSURE: 62 MMHG | HEIGHT: 63 IN

## 2024-05-31 PROCEDURE — 25000003 PHARM REV CODE 250

## 2024-05-31 RX ORDER — DOCUSATE SODIUM 100 MG/1
200 CAPSULE, LIQUID FILLED ORAL 2 TIMES DAILY PRN
Qty: 20 CAPSULE | Refills: 0 | Status: SHIPPED | OUTPATIENT
Start: 2024-05-31

## 2024-05-31 RX ORDER — FERROUS SULFATE 325(65) MG
325 TABLET ORAL DAILY
Qty: 90 TABLET | Refills: 0 | Status: SHIPPED | OUTPATIENT
Start: 2024-05-31

## 2024-05-31 RX ORDER — IBUPROFEN 600 MG/1
600 TABLET ORAL EVERY 6 HOURS PRN
Qty: 25 TABLET | Refills: 25 | Status: SHIPPED | OUTPATIENT
Start: 2024-05-31

## 2024-05-31 RX ADMIN — PRENATAL VIT W/ FE FUMARATE-FA TAB 27-0.8 MG 1 TABLET: 27-0.8 TAB at 08:05

## 2024-05-31 RX ADMIN — IBUPROFEN 600 MG: 600 TABLET, FILM COATED ORAL at 12:05

## 2024-05-31 RX ADMIN — IBUPROFEN 600 MG: 600 TABLET, FILM COATED ORAL at 05:05

## 2024-05-31 RX ADMIN — ACETAMINOPHEN 650 MG: 325 TABLET, FILM COATED ORAL at 12:05

## 2024-05-31 RX ADMIN — ACETAMINOPHEN 650 MG: 325 TABLET, FILM COATED ORAL at 05:05

## 2024-05-31 NOTE — PLAN OF CARE
VSS. Pain well controlled with oral pain medication. Pt ambulating independently and voiding without difficulty. Patient safety maintained, side rails up, bed low and locked position. Significant other at bedside. Baby in NICU; Pt going down to see baby via wheelchair. She is pumping and bringing colostrum to NICU. Patient safety maintained, side rails up, bed low and locked position. Plan of care reviewed throughout shift. Questions encouraged and answered.

## 2024-05-31 NOTE — ANESTHESIA POSTPROCEDURE EVALUATION
Anesthesia Post Evaluation    Patient: Jessika Hassan    Procedure(s) Performed: * No procedures listed *    Final Anesthesia Type: epidural      Patient location during evaluation: labor & delivery  Patient participation: Yes- Able to Participate  Level of consciousness: awake and alert  Post-procedure vital signs: reviewed and stable  Pain management: adequate  Airway patency: patent    PONV status at discharge: No PONV  Anesthetic complications: no      Cardiovascular status: blood pressure returned to baseline  Respiratory status: unassisted  Hydration status: euvolemic  Follow-up not needed.          Vitals Value Taken Time   /66 05/30/24 1934   Temp 36.7 °C (98 °F) 05/30/24 1934   Pulse 76 05/30/24 1934   Resp 18 05/30/24 1934   SpO2 96 % 05/30/24 1934         No case tracking events are documented in the log.      Pain/Mj Score: Pain Rating Prior to Med Admin: 4 (5/30/2024  7:00 PM)  Pain Rating Post Med Admin: 5 (5/30/2024  6:04 PM)

## 2024-05-31 NOTE — LACTATION NOTE
This note was copied from a baby's chart.  NICU Lactation Discharge Note:    Latch assist: LC assisted with latch. Lori continues to be reluctant to latch and suckle at breast. Discussed temporary use of use of nipple shield. With permission, applied 20 mm nipple shield to nipple. Luis latched eagerly but still suckled only briefly before just holding nipple in mouth. Encouraged frequent practice latching then offering full supplement of breast milk or formula as needed then pumping post feeds until infant fully established to  breast and breast milk production has increased. Mother pumping with minimal milk production. Pumped about 0.3 ml with last pumping session.     Discussed importance of a deep latch, signs of a good latch, signs of milk transfer, and how to know if baby is getting enough.  Feeding plan for home:  Encouraged mother to continue transition to exclusive breast feeding under the guidance of the Pediatrician by increasing frequency and duration at breast, and gradually decreasing supplement volume; encouraged mother to put baby to breast on demand when early hunger cues are observed. If signs of an effective latch and active milk transfer are noted, mother to allow baby to nurse 10-15 minutes; mother to continue supplement of expressed breast milk (or formula) as needed until exclusive breast feeding is well established; mother to closely monitor for signs that baby is getting enough (hydration, calories) at breast AEB at least 5-6 heavy, wet diapers/day, 3-4 loose, yellow seedy stools/day, and a continued weight gain of 5-7 ounces/week; mother to follow-up with the Pediatrician for weight checks and as scheduled/needed.  Mom encouraged to double pump until empty after breast feeding and at least 8 x/day until infant fully established at breast and a full breast milk supply is established.    Early feeding cues: Sucking on fingers or hands or bringing hands toward the mouth                                   Sucking motions with mouth or tongue                                  Rooting or turning toward an object that brushes your baby's mouth                                  Acting fretful           Try to latch the baby onto the breast until deep latch occurs or until 10 minutes pass. If unable to latch baby onto breasts or you do not see or hear any signs that baby is getting milk from your breast, bottle feed your baby.     Completed NICU lactation discharge teaching with good understanding verbalized by mother.  Provided mother with written handouts to reinforce verbal instructions.  Encouraged mother to participate in a breast feeding support group to facilitate meeting her breast feeding goals.  Provided mother with list of lactation community resources as well as NICU lactation contact numbers.  Beena Welch, BSN, RNC, CLC, IBCLC

## 2024-05-31 NOTE — LACTATION NOTE
05/31/24 1424   Breast Pumping   Breast Pumping Interventions frequent pumping encouraged;post-feed pumping encouraged   Breast Pumping double electric breast pump utilized   Community Referrals   Community Referrals outpatient lactation program     Patient states she met with ALEX Keyes earlier today. Plan is for her to room in with baby in NICU overnight. Reinforced pumping 8 or more times in 24 hours if baby is not latching and nursing well. Patient states she reviewed discharge education with Anabella

## 2024-05-31 NOTE — DISCHARGE SUMMARY
[facility] ([place])  Obstetrics  Discharge Summary      Patient Name: Jessika Hassan  MRN: [id number]  Admission Date: 2024  Hospital Length of Stay: 3 days  Discharge Date and Time:  2024 9:02 AM  Attending Physician: Winter Reza MD   Discharging Provider: MARY ANN Carvajal   Primary Care Provider: Genesis, Primary Doctor    HPI: Jessika Hassna is a 38 y.o. female  at 40w1d with Estimated Date of Delivery: 24 based on 8wk US who presents to [facility] accompanied by , Ronald. Expecting a girl!     Reports regular uterine contractions since this morning. They are now 5 minutes apart and increasing in intensity and duration.  strong contractions, active fetal movement, No vaginal bleeding , Yes loss of fluid, reports leaking/trickling since 1400     Last ate a smoothie at 1700, last drank water on the way here.     This pregnancy has been complicated by   Patient Active Problem List:     Spitz nevus     Pregnancy     Low-lying placenta in second trimester - RESOLVED     Excessive weight gain affecting pregnancy     Possible evolving fetal macrosomia during pregnancy in third trimester     Primigravida of advanced maternal age in third trimester        Presentation:Vertex by SVE and US  Estimated Fetal Weight: 8lb 12oz. US at 36wk showed AGA fetus in 79th%, with EFW 3435 with AC 3 weeks ahead.      Birth Center Risk Assessment: 1- CNM management on L&D - TN     CNM management in ABC  CNM management on L&D  Consultation with OB to develop  plan of care  Collaborative CNM/OB management with delivery on L&D              4-   Permanent referral of care to MD        * No surgery found *     Hospital Course:   1830 - 2/-3  0 - 3/80/-3, cervical ripening balloon placed. Comfortable with epidural. Pitocin starting  0200 - /-1, CRB out with gentle traction  0600 - 6/-1 Pit at 8 mu  1030- 8/90/0 , Fetal tachycardia, Pit off, LR Bolus. Temp 100.2  1045 - FSE  placed. Temp 100.4. Tylenol 1 gm, IV abx, Chorio. ROM x 8+ hours  24 PPD#1 - Doing well. Just returned from NICU. Pumping for baby in NICU.     2024 PPD #2   38 year old  s/p . Second degree, labial and sulcus laceration. QBL 600cc. Postpartum anemia, asymptomatic. VSS  normotensive since birth, with several elevated Bps in labor, none severe range, and afebrile. Denies s/s of pre/e. Pumping currently as baby is in NICU. PO pain meds managing postpartum discomforts. Rh +, RI. Desired method of contraception is vasectomy. UTD on immunizations except COVID booster. Normal involution. Plan D/C today.     Doing well, ambulating, voiding, and tolerating regular diet  Lochia: steadily decreasing  Pain: well controlled with PO  medication  Breasts/nipples: pumping as baby in NICU depression/anxiety: hx of anxiety   Support at home: family   Contraception: considering vasectomy for partner; understands that progesterone only options are appropriate with breastfeeding  [place]: baby is doing well, will f/u with pediatrician       Gen: A&O x 4, NAD  CV: normal HR  Lungs: normal resp effort  Breasts: bilaterally soft, non-tender, nipples intact without breakdown  Abdomen: soft, non-tender, uterus firm at U - 2 fb  Diastasis Recti:  3 FB  Perineum: well approximated, no edema or ecchymosis   Lochia: minimal rubra  Ext: bilaterally no pedal edema without signs of DVT    Final Active Diagnoses:    Diagnosis Date Noted POA    PRINCIPAL PROBLEM:   (spontaneous vaginal delivery) [O80] 2024 Not Applicable    Breast feeding status of mother [Z39.1] 2024 Not Applicable    Second degree perineal laceration during delivery, delivered [O70.1] 2024 No    Postpartum anemia [O90.81] 2024 No    Chorioamnionitis in third trimester [O41.1230] 2024 No    Shoulder dystocia during labor and delivery [O66.0] 2024 No    At risk for postpartum depression [Z91.89] 2024 Not Applicable  "   Gestational hypertension, antepartum [O13.9] 2024 Yes      Problems Resolved During this Admission:    Diagnosis Date Noted Date Resolved POA    Primigravida of advanced maternal age in third trimester [O09.513] 2024 Yes    Possible evolving fetal macrosomia during pregnancy in third trimester [O36.63X0] 2024 Yes        Significant Diagnostic Studies: Labs: All labs within the past 24 hours have been reviewed      Feeding Method: breast    Immunizations       Date Immunization Status Dose Route/Site Given by    24 MMR Incomplete 0.5 mL Subcutaneous/     24 Tdap Incomplete 0.5 mL Intramuscular/             Delivery:    Episiotomy: None   Lacerations: 2nd   Repair suture:     Repair # of packets: 5   Blood loss (ml):       Birth information:  YOB: 2022   Time of birth: 8:27 PM   Sex: female   Delivery type: Vaginal, Spontaneous   Gestational Age: 40w2d     Measurements    Weight: 4020 g  Weight (lbs): 8 lb 13.8 oz  Length: 54 cm  Length (in): 21.26"         Delivery Clinician: Delivery Providers    Delivering clinician: Amina Victoria CNM   Provider Role    Rosalinda Null MD Attending    Jessie Alegria MD Resident    Josephine Craig RN Registered Nurse    Georgiana Marie RN Registered Nurse    Rebecca Thapa, [facility]    Patsy Hernandez RN Registered Nurse             Additional  information:  Forceps:    Vacuum:    Breech:    Observed anomalies      Living?:     Apgars    Living status: Living  Apgar Component Scores:  1 min.:  5 min.:  10 min.:  15 min.:  20 min.:    Skin color:  0  1       Heart rate:  2  2       Reflex irritability:  0  1       Muscle tone:  0  1       Respiratory effort:  1  2       Total:  3  7       Apgars assigned by: NICU         Placenta: Delivered:       appearance  Pending Diagnostic Studies:       None            Discharged Condition: good    Disposition: Home or Self Care    Follow " Up:   Follow-up Information       Su King CNM Follow up in 3 day(s).    Specialty: Obstetrics and Gynecology  Why: Blood pressure check  Contact information:  [address] [address]  [phone]               uS King CNM Follow up in 2 week(s).    Specialty: Obstetrics and Gynecology  Why: Mood check  Contact information:  [address] [id number] [phone]               Su King CNM Follow up in 6 week(s).    Specialty: Obstetrics and Gynecology  Why: Routine postpartum  Contact information:  [address] [facility] [address]  [place] [id number] [phone]                           Patient Instructions:   Follow Up/Patient Instructions:   1. Reviewed postpartum recommendations and precautions ~ encouraged to call for fever, severe or increased pain in head, chest, abdomen, perineum or legs; heavy bleeding, foul smelling lochia, signs of depression/anxiety, or any other concern. Reviewed postpartum precautions r/t high blood pressure ~ encouraged to call for HA, vision changes, epigastric discomfort, or abnormal swelling.  2. Rx provided: ferrous sulfate, ibuprofen, colace.   3. Contraception: considering vasectomy for partner; will f/u at postpartum visit. Encouraged abstinence and pelvic rest for healing until after postpartum check-up.   4. Encouraged to continue PNV while breastfeeding or at least for 6 weeks postpartum. Rx for Fe.  5. Car seat law will be reviewed with patient at discharge per protocol  6. RTO in 4-6 weeks or sooner prn.  7. Discharge home today with written and verbal postpartum instructions and precautions.       Diet Adult Regular     Notify your health care provider if you experience any of the following:  temperature >100.4     Notify your health care provider if you experience any of the following:  persistent nausea and vomiting or diarrhea     Notify your health care provider if you experience any  of the following:  severe uncontrolled pain     Notify your health care provider if you experience any of the following:  redness, tenderness, or signs of infection (pain, swelling, redness, odor or green/yellow discharge around incision site)     Notify your health care provider if you experience any of the following:  difficulty breathing or increased cough     Notify your health care provider if you experience any of the following:  severe persistent headache     Notify your health care provider if you experience any of the following:  worsening rash     Notify your health care provider if you experience any of the following:  persistent dizziness, light-headedness, or visual disturbances     Notify your health care provider if you experience any of the following:  increased confusion or weakness     Activity as tolerated     Medications:  Current Discharge Medication List        START taking these medications    Details   docusate sodium (COLACE) 100 MG capsule Take 2 capsules (200 mg total) by mouth 2 (two) times daily as needed for Constipation.  Qty: 20 capsule, Refills: 0      ferrous sulfate (FEOSOL) 325 mg (65 mg iron) Tab tablet Take 1 tablet (325 mg total) by mouth once daily.  Qty: 90 tablet, Refills: 0      ibuprofen (ADVIL,MOTRIN) 600 MG tablet Take 1 tablet (600 mg total) by mouth every 6 (six) hours as needed for Pain.  Qty: 25 tablet, Refills: 25           CONTINUE these medications which have NOT CHANGED    Details   prenatal vit/iron fum/folic ac (PRENATAL 1+1 ORAL) Take by mouth.           STOP taking these medications       aspirin (ECOTRIN) 81 MG EC tablet Comments:   Reason for Stopping:         hydrocortisone 2.5 % cream Comments:   Reason for Stopping:           A/P:     2024 PPD #2   38 year old  s/p . Postpartum anemia, asymptomatic. Several elevated Bps in labor, none severe range. Pumping currently as baby is in NICU.  Rh +, RI. Desired method of contraception is  vasectomy. UTD on immunizations except COVID booster.     Plan D/C today.    Connie Fernandez CNM  Obstetrics  [facility] ([place])

## 2024-06-01 NOTE — PHYSICIAN QUERY
Please specify the diagnosis or diagnoses associated with the clinical findings from the query:  Postpartum uterine atony with hemorrhage ( with administration of uterotonic medication)

## 2024-06-03 ENCOUNTER — PATIENT MESSAGE (OUTPATIENT)
Dept: OBSTETRICS AND GYNECOLOGY | Facility: OTHER | Age: 39
End: 2024-06-03
Payer: COMMERCIAL

## 2024-06-04 NOTE — PROGRESS NOTES
Jessika Hassan 38 y.o. female is here today for Blood Pressure check.   History of TN     Review of patient's allergies indicates:  No Known Allergies  Creatinine   Date Value Ref Range Status   05/28/2024 0.7 0.5 - 1.4 mg/dL Final     Sodium   Date Value Ref Range Status   05/28/2024 140 136 - 145 mmol/L Final     Potassium   Date Value Ref Range Status   05/28/2024 4.3 3.5 - 5.1 mmol/L Final     Comment:     Specimen slightly hemolyzed         Current Outpatient Medications:     docusate sodium (COLACE) 100 MG capsule, Take 2 capsules (200 mg total) by mouth 2 (two) times daily as needed for Constipation., Disp: 20 capsule, Rfl: 0    ferrous sulfate (FEOSOL) 325 mg (65 mg iron) Tab tablet, Take 1 tablet (325 mg total) by mouth once daily., Disp: 90 tablet, Rfl: 0    ibuprofen (ADVIL,MOTRIN) 600 MG tablet, Take 1 tablet (600 mg total) by mouth every 6 (six) hours as needed for Pain., Disp: 25 tablet, Rfl: 25    prenatal vit/iron fum/folic ac (PRENATAL 1+1 ORAL), Take by mouth., Disp: , Rfl:   Patient has not been taking BPs at home.  Patient is asymptomatic.   Complains of fatigue, dizziness. Started taking iron again today.    Encouraged continued iron use, ok to use every other day.  CBC and Fe indices sent    BP: 136/86 , Pulse: 80    RTC in 1 wk for mood check virtually      Amina Victoria CNM

## 2024-06-05 ENCOUNTER — OFFICE VISIT (OUTPATIENT)
Dept: OBSTETRICS AND GYNECOLOGY | Facility: CLINIC | Age: 39
End: 2024-06-05
Payer: COMMERCIAL

## 2024-06-05 ENCOUNTER — LAB VISIT (OUTPATIENT)
Dept: LAB | Facility: OTHER | Age: 39
End: 2024-06-05
Payer: COMMERCIAL

## 2024-06-05 VITALS
DIASTOLIC BLOOD PRESSURE: 86 MMHG | HEART RATE: 80 BPM | BODY MASS INDEX: 31.65 KG/M2 | WEIGHT: 178.69 LBS | SYSTOLIC BLOOD PRESSURE: 136 MMHG

## 2024-06-05 DIAGNOSIS — R53.83 FATIGUE, UNSPECIFIED TYPE: ICD-10-CM

## 2024-06-05 DIAGNOSIS — O13.9 GESTATIONAL HYPERTENSION, ANTEPARTUM: Primary | ICD-10-CM

## 2024-06-05 DIAGNOSIS — O16.3 ELEVATED BLOOD PRESSURE AFFECTING PREGNANCY IN THIRD TRIMESTER, ANTEPARTUM: ICD-10-CM

## 2024-06-05 LAB
ALBUMIN SERPL BCP-MCNC: 3.1 G/DL (ref 3.5–5.2)
ALP SERPL-CCNC: 82 U/L (ref 55–135)
ALT SERPL W/O P-5'-P-CCNC: 39 U/L (ref 10–44)
ANION GAP SERPL CALC-SCNC: 10 MMOL/L (ref 8–16)
AST SERPL-CCNC: 18 U/L (ref 10–40)
BASOPHILS # BLD AUTO: 0.04 K/UL (ref 0–0.2)
BASOPHILS NFR BLD: 0.4 % (ref 0–1.9)
BILIRUB SERPL-MCNC: 0.2 MG/DL (ref 0.1–1)
BUN SERPL-MCNC: 14 MG/DL (ref 6–20)
CALCIUM SERPL-MCNC: 9.3 MG/DL (ref 8.7–10.5)
CHLORIDE SERPL-SCNC: 109 MMOL/L (ref 95–110)
CO2 SERPL-SCNC: 21 MMOL/L (ref 23–29)
CREAT SERPL-MCNC: 0.8 MG/DL (ref 0.5–1.4)
DIFFERENTIAL METHOD BLD: ABNORMAL
EOSINOPHIL # BLD AUTO: 0.1 K/UL (ref 0–0.5)
EOSINOPHIL NFR BLD: 1.3 % (ref 0–8)
ERYTHROCYTE [DISTWIDTH] IN BLOOD BY AUTOMATED COUNT: 13.8 % (ref 11.5–14.5)
EST. GFR  (NO RACE VARIABLE): >60 ML/MIN/1.73 M^2
FERRITIN SERPL-MCNC: 65 NG/ML (ref 20–300)
GLUCOSE SERPL-MCNC: 95 MG/DL (ref 70–110)
HCT VFR BLD AUTO: 33 % (ref 37–48.5)
HGB BLD-MCNC: 11 G/DL (ref 12–16)
IMM GRANULOCYTES # BLD AUTO: 0.46 K/UL (ref 0–0.04)
IMM GRANULOCYTES NFR BLD AUTO: 4.9 % (ref 0–0.5)
IRON SERPL-MCNC: 169 UG/DL (ref 30–160)
LYMPHOCYTES # BLD AUTO: 2.2 K/UL (ref 1–4.8)
LYMPHOCYTES NFR BLD: 23 % (ref 18–48)
MCH RBC QN AUTO: 32.4 PG (ref 27–31)
MCHC RBC AUTO-ENTMCNC: 33.3 G/DL (ref 32–36)
MCV RBC AUTO: 97 FL (ref 82–98)
MONOCYTES # BLD AUTO: 0.7 K/UL (ref 0.3–1)
MONOCYTES NFR BLD: 7.7 % (ref 4–15)
NEUTROPHILS # BLD AUTO: 5.9 K/UL (ref 1.8–7.7)
NEUTROPHILS NFR BLD: 62.7 % (ref 38–73)
NRBC BLD-RTO: 0 /100 WBC
PLATELET # BLD AUTO: 405 K/UL (ref 150–450)
PMV BLD AUTO: 9.7 FL (ref 9.2–12.9)
POTASSIUM SERPL-SCNC: 4.5 MMOL/L (ref 3.5–5.1)
PROT SERPL-MCNC: 6.7 G/DL (ref 6–8.4)
RBC # BLD AUTO: 3.39 M/UL (ref 4–5.4)
SATURATED IRON: 31 % (ref 20–50)
SODIUM SERPL-SCNC: 140 MMOL/L (ref 136–145)
TOTAL IRON BINDING CAPACITY: 552 UG/DL (ref 250–450)
TRANSFERRIN SERPL-MCNC: 373 MG/DL (ref 200–375)
WBC # BLD AUTO: 9.39 K/UL (ref 3.9–12.7)

## 2024-06-05 PROCEDURE — 85025 COMPLETE CBC W/AUTO DIFF WBC: CPT

## 2024-06-05 PROCEDURE — 82728 ASSAY OF FERRITIN: CPT

## 2024-06-05 PROCEDURE — 80053 COMPREHEN METABOLIC PANEL: CPT | Performed by: STUDENT IN AN ORGANIZED HEALTH CARE EDUCATION/TRAINING PROGRAM

## 2024-06-05 PROCEDURE — 99999 PR PBB SHADOW E&M-EST. PATIENT-LVL II: CPT | Mod: PBBFAC,,,

## 2024-06-05 PROCEDURE — 3079F DIAST BP 80-89 MM HG: CPT | Mod: CPTII,S$GLB,,

## 2024-06-05 PROCEDURE — 0503F POSTPARTUM CARE VISIT: CPT | Mod: CPTII,S$GLB,,

## 2024-06-05 PROCEDURE — 83540 ASSAY OF IRON: CPT

## 2024-06-05 PROCEDURE — 3075F SYST BP GE 130 - 139MM HG: CPT | Mod: CPTII,S$GLB,,

## 2024-06-05 PROCEDURE — 36415 COLL VENOUS BLD VENIPUNCTURE: CPT

## 2024-06-13 ENCOUNTER — OFFICE VISIT (OUTPATIENT)
Dept: OBSTETRICS AND GYNECOLOGY | Facility: CLINIC | Age: 39
End: 2024-06-13
Payer: COMMERCIAL

## 2024-06-13 DIAGNOSIS — Z91.89 AT RISK FOR POSTPARTUM DEPRESSION: ICD-10-CM

## 2024-06-13 PROCEDURE — 0503F POSTPARTUM CARE VISIT: CPT | Mod: CPTII,95,,

## 2024-06-13 PROCEDURE — 1159F MED LIST DOCD IN RCRD: CPT | Mod: CPTII,95,,

## 2024-06-13 NOTE — PROGRESS NOTES
Postpartum MOOD evaluation:    Jessika Hassan is a 38 y.o. female  is here for a postpartum visit. She is 2 weeks postpartum following a spontaneous vaginal delivery, of a female infant.  Appointment is for mood check today.     Significant Risk Factors for PPD:    OB History    Para Term  AB Living   3 1 1   2 1   SAB IAB Ectopic Multiple Live Births     2   0 1      # Outcome Date GA Lbr Mitch/2nd Weight Sex Type Anes PTL Lv   3 Term 24 40w2d / 02:09 4.02 kg (8 lb 13.8 oz) F Vag-Spont EPI N DOTTY      Complications: Shoulder Dystocia, Intraamniotic Infection   2 IAB  7w0d    TAB      1 IAB  7w0d    TAB          Postpartum course has been uncomplicated.    Bleeding thin lochia. Bowel/ bladder function is normal.       Postpartum depression screening: negative. EPDS 3.    EPDS  1) I have been able to laugh and see the funny side of things.   As much as I always could 0   Not quite so much now 1   Definitely not so much now 2   Not at all 3    2) I have looked forward with enjoyment to things.   As much as I ever did 0   Rather less than I used to 1   Definitely less than I used to 2   Hardly at all 3    3) I have blamed myself unnecessarily when things went wrong.   Yes, most of the time 3   Yes, some of the time 2   Not very often 1   No, never 0    4) I have been anxious or worried for no good reason.   No not at all 0   Hardly ever 1   Yes, sometimes 2   Yes, very often 3    5) I have felt scared or panicky for no very good reason.   Yes, quite a lot 3   Yes, sometimes 2   No, not much 1   No, not at all 0    6) Things have been getting on top of me.   Yes, most of the time I havent been able to cope at all 3   Yes, sometimes I havent been coping as well as usual 2   No, most of the time I have coped quite well 1   No, I have been coping as well as ever 0    7) I have been so unhappy that I have had difficulty sleeping.   Yes, most of the time 3   Yes, sometimes 2   Not  very often 1   No, not at all 0    8) I have felt sad or miserable.   Yes, most of the time 3   Yes, sometimes 2   Not very often 1   No, not at all 0    9) I have been so unhappy that I have been crying.   Yes, most of the time 3   Yes, quite often 2   Only occasionally 1   No, never 0    10) The thought of harming myself has occurred to me.   Yes, quite often 3   Sometimes 2   Hardly ever 1   Never 0    Score: 3     Baby's course has been uncomplicated. Baby is feeding by breast and formula.     ROS:  GENERAL: No fever, chills, fatigability.  VULVAR: No pain, no lesions and no itching.  VAGINAL: No relaxation, no itching, no discharge, no abnormal bleeding and no lesions.  ABDOMEN: No abdominal pain. Denies nausea. Denies vomiting. No diarrhea. No constipation  BREAST: Denies pain. No lumps. No discharge.  URINARY: No incontinence, no nocturia, no frequency and no dysuria.  CARDIOVASCULAR: No chest pain. No shortness of breath. No leg cramps.  NEUROLOGICAL: No headaches. No vision changes.    Past Medical History:   Diagnosis Date    Possible evolving fetal macrosomia during pregnancy in third trimester 05/06/2024    Spitz nevus 09/29/2016     Past Surgical History:   Procedure Laterality Date    NO PAST SURGERIES       Review of patient's allergies indicates:  No Known Allergies    Current Outpatient Medications:     docusate sodium (COLACE) 100 MG capsule, Take 2 capsules (200 mg total) by mouth 2 (two) times daily as needed for Constipation., Disp: 20 capsule, Rfl: 0    ferrous sulfate (FEOSOL) 325 mg (65 mg iron) Tab tablet, Take 1 tablet (325 mg total) by mouth once daily., Disp: 90 tablet, Rfl: 0    ibuprofen (ADVIL,MOTRIN) 600 MG tablet, Take 1 tablet (600 mg total) by mouth every 6 (six) hours as needed for Pain., Disp: 25 tablet, Rfl: 25    prenatal vit/iron fum/folic ac (PRENATAL 1+1 ORAL), Take by mouth., Disp: , Rfl:       There were no vitals filed for this visit.    General appearance - alert, well  appearing, and in no distress and oriented to person, place, and time  Mental status - alert, oriented to person, place, and time, normal mood, behavior, speech, dress, motor activity, and thought processes  Neuro: Alert, oriented x 3 in no apparent distress     There are no diagnoses linked to this encounter.    At risk for PPD:   Patient denies homicidal or suicidal ideations. Counseling encouraged and references offered.   Educated about adjunct treatments including but not limited to avoidance of stimulants including caffeine, participation in regular exercise, yoga, meditation, and breathing exercises and daily exposure to sun/light therapy. Vitamin B6 daily may be helpful in some cases and safe to take on regular basis, and/or B-compled.  Also discussed setting aside at least 1hr/week for which she has to herself (without childcare or other responsibilities) for which she can participate in self-care.   PP Community resources/ counseling references discussed.   [facility]GL[facility] support groups Encouraged:   Snuggles and Struggles @ [facility]     Beyond the Blues @ [employer]    [facility] [employer]         Routine follow up in 1 yr  Jenny Eldridge CNM, MSN  06/13/2024  11:46 AM

## 2024-07-08 ENCOUNTER — OFFICE VISIT (OUTPATIENT)
Dept: OBSTETRICS AND GYNECOLOGY | Facility: CLINIC | Age: 39
End: 2024-07-08
Payer: COMMERCIAL

## 2024-07-08 VITALS
BODY MASS INDEX: 30.56 KG/M2 | HEART RATE: 72 BPM | WEIGHT: 172.5 LBS | DIASTOLIC BLOOD PRESSURE: 73 MMHG | SYSTOLIC BLOOD PRESSURE: 117 MMHG | HEIGHT: 63 IN

## 2024-07-08 DIAGNOSIS — Z91.89 AT RISK FOR POSTPARTUM DEPRESSION: ICD-10-CM

## 2024-07-08 PROCEDURE — 0503F POSTPARTUM CARE VISIT: CPT | Mod: ,,,

## 2024-07-08 PROCEDURE — 99213 OFFICE O/P EST LOW 20 MIN: CPT | Mod: PBBFAC

## 2024-07-08 PROCEDURE — 99999 PR PBB SHADOW E&M-EST. PATIENT-LVL III: CPT | Mod: PBBFAC,,,

## 2024-07-08 NOTE — PROGRESS NOTES
Postpartum Visit  Jessika Hassan is a 38 y.o. female  is here for a postpartum visit. She is 6 weeks postpartum following a spontaneous vaginal delivery of a female infant weighinlb 13oz, with Anesthesia: epidural. . The delivery was at 40w2d. The delivery was complicated by a shoulder dystocia and IAI.  was admitted to NICU after birth but is doing well now.     OB History    Para Term  AB Living   3 1 1   2 1   SAB IAB Ectopic Multiple Live Births     2   0 1      # Outcome Date GA Lbr Mitch/2nd Weight Sex Type Anes PTL Lv   3 Term 24 40w2d / 02:09 4.02 kg (8 lb 13.8 oz) F Vag-Spont EPI N DOTTY      Complications: Shoulder Dystocia, Intraamniotic Infection   2 IAB  7w0d    TAB      1 IAB  7w0d    TAB          Pregnancy was complicated by:   Patient Active Problem List   Diagnosis    Spitz nevus    Pregnancy    Low-lying placenta in second trimester - RESOLVED    Excessive weight gain affecting pregnancy    Gestational hypertension    Chorioamnionitis in third trimester     (spontaneous vaginal delivery)    Shoulder dystocia during labor and delivery    At risk for postpartum depression    Postpartum anemia    Breast feeding status of mother    Second degree perineal laceration during delivery, delivered    Third-stage postpartum hemorrhage       Postpartum course has been uncomplicated.    Bleeding no bleeding. Bowel/ bladder function is normal.   Her last pap was: 2023 NILM, neg HPV.    Patient is not yet sexually active.   Desired contraception method is vasectomy and condoms until partner schedules his appointment.     Postpartum depression screening: negative. EPDS 0.    Baby's course has been uncomplicated. Baby is feeding by both breast and bottle/formula.     ROS:  GENERAL: No fever, chills, fatigability.  VULVAR: No pain, no lesions and no itching.  VAGINAL: No relaxation, no itching, no discharge, no abnormal bleeding and no lesions. Reports vaginal  dryness.   ABDOMEN: No abdominal pain. Denies nausea. Denies vomiting. No diarrhea. No constipation. Reports some abdominal muscle weakness.   BREAST: Denies pain. No lumps. No discharge.  URINARY: No incontinence, no nocturia, no frequency and no dysuria.  CARDIOVASCULAR: No chest pain. No shortness of breath. No leg cramps.  NEUROLOGICAL: No headaches. No vision changes.    Past Medical History:   Diagnosis Date    Possible evolving fetal macrosomia during pregnancy in third trimester 05/06/2024    Spitz nevus 09/29/2016     Past Surgical History:   Procedure Laterality Date    NO PAST SURGERIES       Review of patient's allergies indicates:  No Known Allergies    Current Outpatient Medications:     docusate sodium (COLACE) 100 MG capsule, Take 2 capsules (200 mg total) by mouth 2 (two) times daily as needed for Constipation., Disp: 20 capsule, Rfl: 0    ferrous sulfate (FEOSOL) 325 mg (65 mg iron) Tab tablet, Take 1 tablet (325 mg total) by mouth once daily., Disp: 90 tablet, Rfl: 0    ibuprofen (ADVIL,MOTRIN) 600 MG tablet, Take 1 tablet (600 mg total) by mouth every 6 (six) hours as needed for Pain. (Patient not taking: Reported on 7/8/2024), Disp: 25 tablet, Rfl: 25    prenatal vit/iron fum/folic ac (PRENATAL 1+1 ORAL), Take by mouth. (Patient not taking: Reported on 7/8/2024), Disp: , Rfl:       Vitals:    07/08/24 1529   BP: 117/73   Pulse: 72       General appearance - alert, well appearing, and in no distress  Mental status - alert, oriented to person, place, and time, normal mood, behavior, speech, dress, motor activity, and thought processes  Skin - coloration normal for race, good turgor, warm to touch, no rashes  Abdomen - soft, nontender, nondistended, no masses or organomegaly  Pelvic -   External genitalia postpartum: normal, well-healed, without lesions or masses.  Normal female hair distribution. Adequate perineal body. Urethral meatus without lesions or prolapse. Urethra: no masses, tenderness,  or scarring.  Bladder: without tenderness or masses.  Vaginal mucosa moist and pink, normal rugae, without lesions, abnormal discharge, or foul odor.    Extremities - no edema, redness or tenderness in the calves or thighs    Jessika was seen today for postpartum care.    Diagnoses and all orders for this visit:    Postpartum exam      Discussed contraception - pt's partner plans vasectomy and they plan to use condoms until then.  Counseling regarding resuming normal activities of exercise and work.  Discussed water-based lubricant for vaginal dryness.  Postpartum precautions reviewed    Routine follow up in 1 yr    DIPIKA Bains

## 2024-07-17 NOTE — PROGRESS NOTES
I have seen the patient, reviewed the Student Nurse-Midwife's assessment and plan. I have personally interviewed and examined the patient at bedside and: agree with the findings.  Jenny Eldrdige CNM